# Patient Record
Sex: FEMALE | Race: WHITE | URBAN - METROPOLITAN AREA
[De-identification: names, ages, dates, MRNs, and addresses within clinical notes are randomized per-mention and may not be internally consistent; named-entity substitution may affect disease eponyms.]

---

## 2017-02-25 ENCOUNTER — EMERGENCY (EMERGENCY)
Facility: HOSPITAL | Age: 22
LOS: 0 days | Discharge: HOME | End: 2017-02-25

## 2017-06-27 DIAGNOSIS — M62.830 MUSCLE SPASM OF BACK: ICD-10-CM

## 2017-06-27 DIAGNOSIS — J45.909 UNSPECIFIED ASTHMA, UNCOMPLICATED: ICD-10-CM

## 2017-06-27 DIAGNOSIS — M54.2 CERVICALGIA: ICD-10-CM
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
## 2017-07-15 ENCOUNTER — EMERGENCY (EMERGENCY)
Facility: HOSPITAL | Age: 22
LOS: 0 days | Discharge: HOME | End: 2017-07-15

## 2017-07-15 DIAGNOSIS — S00.03XA CONTUSION OF SCALP, INITIAL ENCOUNTER: ICD-10-CM

## 2017-07-15 DIAGNOSIS — Z79.899 OTHER LONG TERM (CURRENT) DRUG THERAPY: ICD-10-CM

## 2017-07-15 DIAGNOSIS — R51 HEADACHE: ICD-10-CM
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
## 2017-07-15 DIAGNOSIS — J45.909 UNSPECIFIED ASTHMA, UNCOMPLICATED: ICD-10-CM

## 2017-09-03 ENCOUNTER — EMERGENCY (EMERGENCY)
Facility: HOSPITAL | Age: 22
LOS: 0 days | Discharge: HOME | End: 2017-09-03

## 2017-09-03 DIAGNOSIS — R35.0 FREQUENCY OF MICTURITION: ICD-10-CM

## 2017-09-03 DIAGNOSIS — F41.9 ANXIETY DISORDER, UNSPECIFIED: ICD-10-CM

## 2017-09-03 DIAGNOSIS — R10.9 UNSPECIFIED ABDOMINAL PAIN: ICD-10-CM

## 2017-09-03 DIAGNOSIS — J45.909 UNSPECIFIED ASTHMA, UNCOMPLICATED: ICD-10-CM

## 2017-09-03 DIAGNOSIS — M54.6 PAIN IN THORACIC SPINE: ICD-10-CM

## 2017-09-03 DIAGNOSIS — Z79.899 OTHER LONG TERM (CURRENT) DRUG THERAPY: ICD-10-CM

## 2018-04-21 ENCOUNTER — EMERGENCY (EMERGENCY)
Facility: HOSPITAL | Age: 23
LOS: 0 days | Discharge: HOME | End: 2018-04-21
Attending: EMERGENCY MEDICINE | Admitting: EMERGENCY MEDICINE

## 2018-04-21 VITALS
TEMPERATURE: 99 F | OXYGEN SATURATION: 96 % | HEIGHT: 66 IN | WEIGHT: 229.94 LBS | SYSTOLIC BLOOD PRESSURE: 149 MMHG | HEART RATE: 109 BPM | DIASTOLIC BLOOD PRESSURE: 98 MMHG | RESPIRATION RATE: 18 BRPM

## 2018-04-21 DIAGNOSIS — M79.89 OTHER SPECIFIED SOFT TISSUE DISORDERS: ICD-10-CM

## 2018-04-21 DIAGNOSIS — M79.672 PAIN IN LEFT FOOT: ICD-10-CM

## 2018-04-21 RX ORDER — IBUPROFEN 200 MG
600 TABLET ORAL ONCE
Qty: 0 | Refills: 0 | Status: COMPLETED | OUTPATIENT
Start: 2018-04-21 | End: 2018-04-21

## 2018-04-21 RX ADMIN — Medication 600 MILLIGRAM(S): at 13:15

## 2018-04-21 NOTE — ED PROVIDER NOTE - NS ED ROS FT
Constitutional: (-) fever  Skin: No rash  Muskuloskeletal: left foot pain  Neurological: (-) altered mental status

## 2018-04-21 NOTE — ED PROVIDER NOTE - PHYSICAL EXAMINATION
Physical Exam    Vital Signs: I have reviewed the initial vital signs.  Constitutional: well-nourished, appears stated age, no acute distress  Skin: warm, dry,  no rash  Muskuloskeletal: left foot: +swelling and tender to palpation dorsum of foot over MT. No ecchymosis. FROM ankle and foot. n/v intact. limping gait  Neuro: AOx3, Motor 5/5, Sensation: equal and intact

## 2018-04-21 NOTE — ED ADULT TRIAGE NOTE - CHIEF COMPLAINT QUOTE
Patient complaining of pain to left foot that started one month ago. Pain worsened in past day, described as sharp spasm type pain 8/10.

## 2018-04-21 NOTE — ED PROVIDER NOTE - MEDICAL DECISION MAKING DETAILS
x ray results reviewed with pt and family member and copy of report provided to her.  Rec ALAN.  Will give crutches prn, follow up with Podiatry.

## 2018-04-21 NOTE — ED PROVIDER NOTE - ATTENDING CONTRIBUTION TO CARE
23 yo F presents with c/o left foot pain x 1 month.  No h/o trauma.  Works on her feet.  On exam pt in NAD AAO x 3, + mild swelling with tenderness to dorsum left foot, no skin changes, + DP pulses

## 2018-06-18 ENCOUNTER — EMERGENCY (EMERGENCY)
Facility: HOSPITAL | Age: 23
LOS: 0 days | Discharge: HOME | End: 2018-06-18
Attending: EMERGENCY MEDICINE | Admitting: EMERGENCY MEDICINE

## 2018-06-18 DIAGNOSIS — K14.6 GLOSSODYNIA: ICD-10-CM

## 2018-06-18 RX ORDER — DIPHENHYDRAMINE HYDROCHLORIDE AND LIDOCAINE HYDROCHLORIDE AND ALUMINUM HYDROXIDE AND MAGNESIUM HYDRO
10 KIT ONCE
Qty: 0 | Refills: 0 | Status: COMPLETED | OUTPATIENT
Start: 2018-06-18 | End: 2018-06-18

## 2018-06-18 RX ORDER — DIPHENHYDRAMINE HYDROCHLORIDE AND LIDOCAINE HYDROCHLORIDE AND ALUMINUM HYDROXIDE AND MAGNESIUM HYDRO
10 KIT
Qty: 178 | Refills: 0 | OUTPATIENT
Start: 2018-06-18 | End: 2018-06-22

## 2018-06-18 NOTE — ED PROVIDER NOTE - MEDICAL DECISION MAKING DETAILS
I personally evaluated the patient. I reviewed the Resident’s or Physician Assistant’s note (as assigned above), and agree with the findings and plan except as documented in my note.  Chart reviewed. Presents with burning sensation to tongue. No SOB or drooling. Exam shows alert patient in no distress, HEENT tongue no swelling, ulcers or exudates, throat no exudates, lungs clear. Given Magic Mouthwash and referred to ENT.

## 2018-06-18 NOTE — ED PROVIDER NOTE - OBJECTIVE STATEMENT
23 year old female no pmhx c/o tongue pain and burning that woke her up from sleep. Pt has also been brushing her tongue multiple times. No difficulty swallowing/breathing. 23 year old female no pmhx c/o tongue pain and burning that woke her up from sleep. Pt has also been brushing her tongue multiple times. No difficulty swallowing/breathing. Denies any lesions/ulcers/sore on tongue/mouth.

## 2018-06-18 NOTE — ED PROVIDER NOTE - PHYSICAL EXAMINATION
CONSTITUTIONAL: Well-appearing; well-nourished; in no apparent distress.   EYES: PERRLA; EOM intact.   ENT: No tongue swelling, ulcerations, normal nose; no rhinorrhea; normal pharynx with no tonsillar hypertrophy, no exudates.   NECK: Supple; non-tender; no cervical lymphadenopathy. No JVD.   CARDIOVASCULAR: Normal S1, S2; no murmurs, rubs, or gallops.   RESPIRATORY: Normal chest excursion with respiration; breath sounds clear and equal bilaterally; no wheezes, rhonchi, or rales.  SKIN: Normal for age and race; warm; dry; good turgor; no apparent lesions or exudate.   NEURO/PSYCH: A & O x 4; grossly unremarkable. mood and manner are appropriate. Grooming and personal hygiene are appropriate. No apparent thoughts of harm to self or others. CONSTITUTIONAL: Well-appearing; well-nourished; in no apparent distress.   EYES: PERRLA; EOM intact.   ENT: No tongue swelling, ulcerations/lesions, normal nose; no rhinorrhea; normal pharynx with no tonsillar hypertrophy, no exudate, abscesses, uvula midline.   NECK: Supple; non-tender; no cervical lymphadenopathy. No JVD.   CARDIOVASCULAR: Normal S1, S2; no murmurs, rubs, or gallops.   RESPIRATORY: Normal chest excursion with respiration; breath sounds clear and equal bilaterally; no wheezes, rhonchi, or rales.  SKIN: Normal for age and race; warm; dry; good turgor; no apparent lesions or exudate.   NEURO/PSYCH: A & O x 4; grossly unremarkable. mood and manner are appropriate. Grooming and personal hygiene are appropriate. No apparent thoughts of harm to self or others.

## 2018-06-18 NOTE — ED PROVIDER NOTE - PROGRESS NOTE DETAILS
I personally evaluated the patient. I reviewed the Physician Assistant Fellow's note and agree with the findings and plan. no lesions/ulceration/swelling/thrush noted on tongue. advised patient those white spots on tongue are normal and stopped brushing her tongue. First BLM given to patient for symptom control. f/u with ENT.

## 2018-06-18 NOTE — ED PROVIDER NOTE - NS ED ROS FT
CONST: No fever, chills or bodyaches  EYES: No pain, redness, drainage or visual changes.  ENT: + tongue pain/burning. No ear pain or discharge, nasal discharge or congestion. No sore throat  CARD: No chest pain, palpitations  RESP: No SOB, cough, hemoptysis. No hx of asthma or COPD  GI: No abdominal pain, N/V/D  MS: No joint pain, back pain or extremity pain/injury  SKIN: No rashes  NEURO: No headache, dizziness, paresthesias or LOC

## 2018-06-20 RX ORDER — LIDOCAINE 4 G/100G
5 CREAM TOPICAL
Qty: 100 | Refills: 0 | OUTPATIENT
Start: 2018-06-20 | End: 2018-06-25

## 2018-06-22 ENCOUNTER — EMERGENCY (EMERGENCY)
Facility: HOSPITAL | Age: 23
LOS: 0 days | Discharge: HOME | End: 2018-06-22
Attending: EMERGENCY MEDICINE | Admitting: EMERGENCY MEDICINE

## 2018-06-22 VITALS
RESPIRATION RATE: 20 BRPM | DIASTOLIC BLOOD PRESSURE: 85 MMHG | TEMPERATURE: 98 F | SYSTOLIC BLOOD PRESSURE: 134 MMHG | HEART RATE: 150 BPM | OXYGEN SATURATION: 98 %

## 2018-06-22 VITALS — SYSTOLIC BLOOD PRESSURE: 128 MMHG | TEMPERATURE: 100 F | DIASTOLIC BLOOD PRESSURE: 71 MMHG | HEART RATE: 93 BPM

## 2018-06-22 DIAGNOSIS — R42 DIZZINESS AND GIDDINESS: ICD-10-CM

## 2018-06-22 DIAGNOSIS — R00.2 PALPITATIONS: ICD-10-CM

## 2018-06-22 DIAGNOSIS — N39.0 URINARY TRACT INFECTION, SITE NOT SPECIFIED: ICD-10-CM

## 2018-06-22 LAB
ALBUMIN SERPL ELPH-MCNC: 4.5 G/DL — SIGNIFICANT CHANGE UP (ref 3.5–5.2)
ALP SERPL-CCNC: 48 U/L — SIGNIFICANT CHANGE UP (ref 30–115)
ALT FLD-CCNC: 26 U/L — SIGNIFICANT CHANGE UP (ref 0–41)
ANION GAP SERPL CALC-SCNC: 13 MMOL/L — SIGNIFICANT CHANGE UP (ref 7–14)
APPEARANCE UR: (no result)
AST SERPL-CCNC: 19 U/L — SIGNIFICANT CHANGE UP (ref 0–41)
BACTERIA # UR AUTO: (no result) /HPF
BASOPHILS # BLD AUTO: 0.02 K/UL — SIGNIFICANT CHANGE UP (ref 0–0.2)
BASOPHILS NFR BLD AUTO: 0.2 % — SIGNIFICANT CHANGE UP (ref 0–1)
BILIRUB SERPL-MCNC: 0.3 MG/DL — SIGNIFICANT CHANGE UP (ref 0.2–1.2)
BILIRUB UR-MCNC: NEGATIVE — SIGNIFICANT CHANGE UP
BUN SERPL-MCNC: 9 MG/DL — LOW (ref 10–20)
CALCIUM SERPL-MCNC: 9.3 MG/DL — SIGNIFICANT CHANGE UP (ref 8.5–10.1)
CHLORIDE SERPL-SCNC: 101 MMOL/L — SIGNIFICANT CHANGE UP (ref 98–110)
CK MB CFR SERPL CALC: <1 NG/ML — SIGNIFICANT CHANGE UP (ref 0.6–6.3)
CK SERPL-CCNC: 84 U/L — SIGNIFICANT CHANGE UP (ref 0–225)
CO2 SERPL-SCNC: 26 MMOL/L — SIGNIFICANT CHANGE UP (ref 17–32)
COLOR SPEC: YELLOW — SIGNIFICANT CHANGE UP
CREAT SERPL-MCNC: 0.6 MG/DL — LOW (ref 0.7–1.5)
D DIMER BLD IA.RAPID-MCNC: 132 NG/ML DDU — SIGNIFICANT CHANGE UP (ref 0–230)
DIFF PNL FLD: (no result)
EOSINOPHIL # BLD AUTO: 0.03 K/UL — SIGNIFICANT CHANGE UP (ref 0–0.7)
EOSINOPHIL NFR BLD AUTO: 0.3 % — SIGNIFICANT CHANGE UP (ref 0–8)
EPI CELLS # UR: (no result) /HPF
GLUCOSE SERPL-MCNC: 106 MG/DL — HIGH (ref 70–99)
GLUCOSE UR QL: NEGATIVE MG/DL — SIGNIFICANT CHANGE UP
HCT VFR BLD CALC: 37.5 % — SIGNIFICANT CHANGE UP (ref 37–47)
HGB BLD-MCNC: 12.2 G/DL — SIGNIFICANT CHANGE UP (ref 12–16)
IMM GRANULOCYTES NFR BLD AUTO: 0.4 % — HIGH (ref 0.1–0.3)
KETONES UR-MCNC: NEGATIVE — SIGNIFICANT CHANGE UP
LEUKOCYTE ESTERASE UR-ACNC: (no result)
LYMPHOCYTES # BLD AUTO: 1.15 K/UL — LOW (ref 1.2–3.4)
LYMPHOCYTES # BLD AUTO: 12.4 % — LOW (ref 20.5–51.1)
MCHC RBC-ENTMCNC: 26.5 PG — LOW (ref 27–31)
MCHC RBC-ENTMCNC: 32.5 G/DL — SIGNIFICANT CHANGE UP (ref 32–37)
MCV RBC AUTO: 81.5 FL — SIGNIFICANT CHANGE UP (ref 81–99)
MONOCYTES # BLD AUTO: 0.61 K/UL — HIGH (ref 0.1–0.6)
MONOCYTES NFR BLD AUTO: 6.6 % — SIGNIFICANT CHANGE UP (ref 1.7–9.3)
NEUTROPHILS # BLD AUTO: 7.44 K/UL — HIGH (ref 1.4–6.5)
NEUTROPHILS NFR BLD AUTO: 80.1 % — HIGH (ref 42.2–75.2)
NITRITE UR-MCNC: NEGATIVE — SIGNIFICANT CHANGE UP
NRBC # BLD: 0 /100 WBCS — SIGNIFICANT CHANGE UP (ref 0–0)
NT-PROBNP SERPL-SCNC: 59 PG/ML — SIGNIFICANT CHANGE UP (ref 0–300)
PH UR: 6 — SIGNIFICANT CHANGE UP (ref 5–8)
PLATELET # BLD AUTO: 301 K/UL — SIGNIFICANT CHANGE UP (ref 130–400)
POTASSIUM SERPL-MCNC: 4.2 MMOL/L — SIGNIFICANT CHANGE UP (ref 3.5–5)
POTASSIUM SERPL-SCNC: 4.2 MMOL/L — SIGNIFICANT CHANGE UP (ref 3.5–5)
PROT SERPL-MCNC: 7.5 G/DL — SIGNIFICANT CHANGE UP (ref 6–8)
PROT UR-MCNC: NEGATIVE MG/DL — SIGNIFICANT CHANGE UP
RBC # BLD: 4.6 M/UL — SIGNIFICANT CHANGE UP (ref 4.2–5.4)
RBC # FLD: 12.2 % — SIGNIFICANT CHANGE UP (ref 11.5–14.5)
RBC CASTS # UR COMP ASSIST: (no result) /HPF
SODIUM SERPL-SCNC: 140 MMOL/L — SIGNIFICANT CHANGE UP (ref 135–146)
SP GR SPEC: 1.02 — SIGNIFICANT CHANGE UP (ref 1.01–1.03)
TROPONIN T SERPL-MCNC: <0.01 NG/ML — SIGNIFICANT CHANGE UP
UROBILINOGEN FLD QL: 1 MG/DL (ref 0.2–0.2)
WBC # BLD: 9.29 K/UL — SIGNIFICANT CHANGE UP (ref 4.8–10.8)
WBC # FLD AUTO: 9.29 K/UL — SIGNIFICANT CHANGE UP (ref 4.8–10.8)
WBC UR QL: (no result) /HPF

## 2018-06-22 RX ORDER — KETOROLAC TROMETHAMINE 30 MG/ML
15 SYRINGE (ML) INJECTION ONCE
Qty: 0 | Refills: 0 | Status: DISCONTINUED | OUTPATIENT
Start: 2018-06-22 | End: 2018-06-22

## 2018-06-22 RX ORDER — SODIUM CHLORIDE 9 MG/ML
1000 INJECTION, SOLUTION INTRAVENOUS ONCE
Qty: 0 | Refills: 0 | Status: COMPLETED | OUTPATIENT
Start: 2018-06-22 | End: 2018-06-22

## 2018-06-22 RX ORDER — NITROFURANTOIN MACROCRYSTAL 50 MG
1 CAPSULE ORAL
Qty: 14 | Refills: 0 | OUTPATIENT
Start: 2018-06-22 | End: 2018-06-28

## 2018-06-22 RX ADMIN — SODIUM CHLORIDE 1000 MILLILITER(S): 9 INJECTION, SOLUTION INTRAVENOUS at 16:06

## 2018-06-22 RX ADMIN — Medication 15 MILLIGRAM(S): at 16:22

## 2018-06-22 NOTE — ED ADULT TRIAGE NOTE - SOURCE OF INFORMATION
Frank Dodson  1960  HOME: [phone]   WORK: [phone]   CELL: N/A         SELF calling and requesting an same day appointment with Dr. Torres for the following symptoms: left foot swollen, leg and ankle pain and difficulty walking on.      Please call.    
Patient

## 2018-06-22 NOTE — ED PROVIDER NOTE - PHYSICAL EXAMINATION
CONSTITUTIONAL: Well-developed; well-nourished; in no acute distress.   SKIN: warm, dry  HEAD: Normocephalic; atraumatic.  EYES: PERRL, EOMI, no conjunctival erythema  ENT: Oropharynx is clear, nonedematous, nonerythematous.  NECK: Supple; non tender.  CARD: S1, S2 normal; no murmurs, gallops, or rubs. Tachycardic.   RESP: No wheezes, rales or rhonchi.  ABD: soft ntnd  EXT: Non-edematous, nonerythematous lower extremities bilaterally.   NEURO: Alert, oriented, grossly unremarkable.  PSYCH: Cooperative, appropriate.

## 2018-06-22 NOTE — ED PROVIDER NOTE - PROGRESS NOTE DETAILS
ATTENDING NOTE: 24 y/o female with no significant pmhx, presents to the ED c/o palpitations that started at 1PM today while she was at work. Pt notes she woke up with a sore throat this morning, but has been having white spots on tongue which she was evaluated [facility] and was told it was fine. Admits to always feeling dizzy which has been occurring for almost a year now with intermittent headaches. Pt is seeing Dr. Almendarez who did MRI 2 weeks ago but unsure of results. Pt also saw ophthalmologist and was told she has papilledema, however pt hasn’t scheduled a LP yet. LMP: 1 month ago, on birth control, no family hx of clotting disorder. No fever, chills, n/v, cp, sob, pleuritic cp, palpitations, diaphoresis, cough, numbness/tingling, neck pain/ stiffness, abd pain, diarrhea, constipation, melena/brbpr, urinary symptoms, trauma, weakness, edema, calf pain/swelling/erythema, sick contacts, recent travel or rash. On exam: Pt sitting comfortably on stretcher in NAD. No rash. Conjunctivae and sclera clear. MMM. OP with no erythema, no exudates, no petechiae, uvula midline, no PTA, no thrush. TM's visualized, no erythema, no mastoid erythema, tachycardic, radial pulses 2/4 b/l. Breath sounds present b/l, CTABL, no wheezing or crackles, no tachypnea, no accessory muscle use, good resp effort and excursion, good air exchange, bs present throughout all 4 quadrants. Abd soft, nd, nt, no rebound or guarding, no cvat, (-) murphys, (-) rovsing (-) obturator (-) psoas. FROM of ext, no edema, no calf pain/swelling/erythema. AAOx3, motor 5/5 and sensation intact throughout upper and lower ext. PERRL, EOMI, CN II-XII intact, negative pronator, negative Romberg. Plan: EKG, CXR, labs, IVF, U-preg, urine, d-dimer, and reassess. Patient developed low grade fever, given 15 mg of Toradol and 1 L bolus of LR. Tachycardia improving- at bedside HR in 110's. U/A and culture waiting to be sent. U/A resulted with many bacteria, moderate leukocyte esterase, blood concerning for UTI. Tachycardia responding to hydration; HR in 90's. Patient informed of urine results and will be discharged with PO abx. ATTENDING NOTE: 22 y/o female with no significant pmhx, presents to the ED c/o palpitations that started at 1PM today while she was at work. Pt notes she woke up with a sore throat this morning, but has been having white spots on tongue which she was evaluated for by the [facility] and was told it was viral. Admits to always feeling dizzy which has been occurring for almost a year now with intermittent headaches. Pt is seeing Dr. Almendarez who did MRI 2 weeks ago but unsure of results. Pt also saw ophthalmologist and was told she has papilledema, and is being followed as outpt for scheduling of LP. LMP: 1 month ago, on birth control, no family hx of clotting disorder. No fever, chills, n/v, cp, sob, pleuritic cp, ear pain, diaphoresis, cough, numbness/tingling, neck pain/ stiffness, photo/phonophobia, neck pain/stiffness, abd pain, diarrhea, constipation, melena/brbpr, urinary sxs, trauma, weakness, edema, calf pain/swelling/erythema, sick contacts, recent travel or rash. On exam: Pt sitting comfortably on stretcher in NAD. No rash. Conjunctivae and sclera clear. MMM. OP with no erythema, no exudates, no petechiae, uvula midline, no PTA, no thrush. no neck pain/stiffnes, supple, no spinous tenderness to palpation, no anterior neck pain, no torticolosis, TM's visualized, no erythema, no mastoid erythema, tachycardic, radial pulses 2/4 b/l. Breath sounds present b/l, CTABL, no wheezing or crackles, no tachypnea, no accessory muscle use, good resp effort and excursion, good air exchange, bs present throughout all 4 quadrants. Abd soft, nd, nt, no rebound or guarding, no cvat, (-) murphys, (-) rovsing (-) obturator (-) psoas. FROM of ext, no edema, no calf pain/swelling/erythema. AAOx3, motor 5/5 and sensation intact throughout upper and lower ext. PERRL, EOMI, CN II-XII intact, negative pronator, negative Romberg. No dysmetria with ftn and rapid alternating fine movements, ambulatory with no ataxia, NIH 0. Plan: EKG, CXR, labs, IVF, U-preg, urine, d-dimer, and reassess.

## 2018-06-22 NOTE — ED PROVIDER NOTE - MEDICAL DECISION MAKING DETAILS
pt feeling much better, tolerated po, HR improved, has been following up w/ neurology extensively, aware of signs and symptoms to return for, reports no vaginal bleeding.discharge, sti's, given abx for uti, reports will follow up with her phsycian, ob-gyn and neurology as she has been.

## 2018-06-22 NOTE — ED PROVIDER NOTE - NS ED ROS FT
Eyes:  No visual changes, eye pain or discharge.  ENMT:  No hearing changes, pain, no sore throat or runny nose, no difficulty swallowing  Cardiac:  No chest pain, SOB or edema.   Respiratory:  No cough or respiratory distress. No hemoptysis. No history of asthma or RAD.  GI:  No vomiting, diarrhea or abdominal pain.  :  No dysuria, frequency or burning.  MS:  No myalgia, muscle weakness, joint pain or back pain.  Neuro:  Dizziness.  Skin:  No skin rash.   Endocrine: No history of thyroid disease or diabetes.

## 2018-06-22 NOTE — ED PROVIDER NOTE - OBJECTIVE STATEMENT
Patient is a 22 yo F w/ PMH of frequent headaches, dizziness and papilledema followed by neurologist Dr. Almendarez, p/w dizziness and new onset palpitations x 2 hours. Patient has experienced dizziness for multiple months, being followed by Dr. Almendarez; found to have papilledema, and plan to get LP for symptomatic relief. 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 Denies chest pain, SOB. Endorses taking OCPs, denies recent travel, calf pain, and swelling. Denies fevers, coughing.

## 2018-06-22 NOTE — ED ADULT NURSE NOTE - OBJECTIVE STATEMENT
patient states last week went to [facility] for tongue swollen with white patches and was told "its nothing" and was sent home, as per patient. Pt was dx by her eye doctor yesterday with papilledema and has a rx for spinal tap with neuro dx on 7/10. Pt states while working today and became dizzy and had palpitations and  came down to the er. pt on birth control. patient also states shes "always dizzy"

## 2018-06-23 LAB
CULTURE RESULTS: SIGNIFICANT CHANGE UP
SPECIMEN SOURCE: SIGNIFICANT CHANGE UP

## 2018-07-11 ENCOUNTER — INPATIENT (INPATIENT)
Facility: HOSPITAL | Age: 23
LOS: 1 days | Discharge: HOME | End: 2018-07-13
Attending: INTERNAL MEDICINE | Admitting: INTERNAL MEDICINE

## 2018-07-11 VITALS
TEMPERATURE: 98 F | OXYGEN SATURATION: 99 % | RESPIRATION RATE: 20 BRPM | SYSTOLIC BLOOD PRESSURE: 131 MMHG | HEART RATE: 89 BPM | DIASTOLIC BLOOD PRESSURE: 87 MMHG

## 2018-07-11 LAB
24R-OH-CALCIDIOL SERPL-MCNC: 54 NG/ML — SIGNIFICANT CHANGE UP (ref 30–80)
ALBUMIN SERPL ELPH-MCNC: 4.4 G/DL — SIGNIFICANT CHANGE UP (ref 3.5–5.2)
ALP SERPL-CCNC: 49 U/L — SIGNIFICANT CHANGE UP (ref 30–115)
ALT FLD-CCNC: 24 U/L — SIGNIFICANT CHANGE UP (ref 0–41)
ANION GAP SERPL CALC-SCNC: 14 MMOL/L — SIGNIFICANT CHANGE UP (ref 7–14)
APTT BLD: 28.5 SEC — SIGNIFICANT CHANGE UP (ref 27–39.2)
AST SERPL-CCNC: 19 U/L — SIGNIFICANT CHANGE UP (ref 0–41)
BASOPHILS # BLD AUTO: 0.05 K/UL — SIGNIFICANT CHANGE UP (ref 0–0.2)
BASOPHILS NFR BLD AUTO: 0.5 % — SIGNIFICANT CHANGE UP (ref 0–1)
BILIRUB DIRECT SERPL-MCNC: <0.2 MG/DL — SIGNIFICANT CHANGE UP (ref 0–0.2)
BILIRUB INDIRECT FLD-MCNC: SIGNIFICANT CHANGE UP MG/DL (ref 0.2–1.2)
BILIRUB SERPL-MCNC: <0.2 MG/DL — SIGNIFICANT CHANGE UP (ref 0.2–1.2)
BUN SERPL-MCNC: 11 MG/DL — SIGNIFICANT CHANGE UP (ref 10–20)
CALCIUM SERPL-MCNC: 9.8 MG/DL — SIGNIFICANT CHANGE UP (ref 8.5–10.1)
CHLORIDE SERPL-SCNC: 103 MMOL/L — SIGNIFICANT CHANGE UP (ref 98–110)
CO2 SERPL-SCNC: 22 MMOL/L — SIGNIFICANT CHANGE UP (ref 17–32)
CREAT SERPL-MCNC: 0.7 MG/DL — SIGNIFICANT CHANGE UP (ref 0.7–1.5)
EOSINOPHIL # BLD AUTO: 0.03 K/UL — SIGNIFICANT CHANGE UP (ref 0–0.7)
EOSINOPHIL NFR BLD AUTO: 0.3 % — SIGNIFICANT CHANGE UP (ref 0–8)
ERYTHROCYTE [SEDIMENTATION RATE] IN BLOOD: 16 MM/HR — HIGH (ref 0–15)
GLUCOSE SERPL-MCNC: 105 MG/DL — HIGH (ref 70–99)
HCT VFR BLD CALC: 38.6 % — SIGNIFICANT CHANGE UP (ref 37–47)
HGB BLD-MCNC: 12.4 G/DL — SIGNIFICANT CHANGE UP (ref 12–16)
IMM GRANULOCYTES NFR BLD AUTO: 0.4 % — HIGH (ref 0.1–0.3)
INR BLD: 1.06 RATIO — SIGNIFICANT CHANGE UP (ref 0.65–1.3)
LYMPHOCYTES # BLD AUTO: 2.72 K/UL — SIGNIFICANT CHANGE UP (ref 1.2–3.4)
LYMPHOCYTES # BLD AUTO: 29.7 % — SIGNIFICANT CHANGE UP (ref 20.5–51.1)
MCHC RBC-ENTMCNC: 26.6 PG — LOW (ref 27–31)
MCHC RBC-ENTMCNC: 32.1 G/DL — SIGNIFICANT CHANGE UP (ref 32–37)
MCV RBC AUTO: 82.7 FL — SIGNIFICANT CHANGE UP (ref 81–99)
MONOCYTES # BLD AUTO: 0.67 K/UL — HIGH (ref 0.1–0.6)
MONOCYTES NFR BLD AUTO: 7.3 % — SIGNIFICANT CHANGE UP (ref 1.7–9.3)
NEUTROPHILS # BLD AUTO: 5.64 K/UL — SIGNIFICANT CHANGE UP (ref 1.4–6.5)
NEUTROPHILS NFR BLD AUTO: 61.8 % — SIGNIFICANT CHANGE UP (ref 42.2–75.2)
NRBC # BLD: 0 /100 WBCS — SIGNIFICANT CHANGE UP (ref 0–0)
PLATELET # BLD AUTO: 312 K/UL — SIGNIFICANT CHANGE UP (ref 130–400)
POTASSIUM SERPL-MCNC: 4 MMOL/L — SIGNIFICANT CHANGE UP (ref 3.5–5)
POTASSIUM SERPL-SCNC: 4 MMOL/L — SIGNIFICANT CHANGE UP (ref 3.5–5)
PROT SERPL-MCNC: 7.5 G/DL — SIGNIFICANT CHANGE UP (ref 6–8)
PROTHROM AB SERPL-ACNC: 11.4 SEC — SIGNIFICANT CHANGE UP (ref 9.95–12.87)
RBC # BLD: 4.67 M/UL — SIGNIFICANT CHANGE UP (ref 4.2–5.4)
RBC # FLD: 12.1 % — SIGNIFICANT CHANGE UP (ref 11.5–14.5)
SODIUM SERPL-SCNC: 139 MMOL/L — SIGNIFICANT CHANGE UP (ref 135–146)
WBC # BLD: 9.15 K/UL — SIGNIFICANT CHANGE UP (ref 4.8–10.8)
WBC # FLD AUTO: 9.15 K/UL — SIGNIFICANT CHANGE UP (ref 4.8–10.8)

## 2018-07-11 RX ORDER — OXYCODONE AND ACETAMINOPHEN 5; 325 MG/1; MG/1
1 TABLET ORAL EVERY 8 HOURS
Qty: 0 | Refills: 0 | Status: DISCONTINUED | OUTPATIENT
Start: 2018-07-11 | End: 2018-07-13

## 2018-07-11 RX ORDER — ACETAZOLAMIDE 250 MG/1
1 TABLET ORAL
Qty: 0 | Refills: 0 | COMMUNITY

## 2018-07-11 NOTE — H&P ADULT - NSHPPHYSICALEXAM_GEN_ALL_CORE
ICU Vital Signs Last 24 Hrs  T(C): 36.7 (11 Jul 2018 11:46), Max: 36.8 (11 Jul 2018 09:27)  T(F): 98.1 (11 Jul 2018 11:46), Max: 98.2 (11 Jul 2018 09:27)  HR: 81 (11 Jul 2018 11:46) (81 - 89)  BP: 91/64 (11 Jul 2018 11:46) (91/64 - 131/87)  RR: 19 (11 Jul 2018 11:46) (19 - 20)  SpO2: 99% (11 Jul 2018 11:46) (99% - 99%)    PHYSICAL EXAM:  GENERAL: NAD, well-developed  HEAD:  Atraumatic, Normocephalic  EYES: EOMI, PERRLA, conjunctiva and sclera clear  NECK: Supple, No JVD  CHEST/LUNG: Clear to auscultation bilaterally; No wheeze  HEART: Regular rate and rhythm; No murmurs, rubs, or gallops  ABDOMEN: Soft, Nontender, Nondistended; Bowel sounds present  EXTREMITIES:  2+ Peripheral Pulses, No clubbing, cyanosis, or edema  PSYCH: AAOx3  NEUROLOGY: non-focal  SKIN: No rashes or lesions

## 2018-07-11 NOTE — ED ADULT NURSE NOTE - OBJECTIVE STATEMENT
Patient present to ED from neurology MD for further evaluation for papilledema. Patient has been experiencing headache, dizziness, nausea, vomiting, and increased photosensitivity within the past year. Neuro MD is requesting MRI/MRA and LP for diagnosing. At this time, patient denies symptoms is currently on diomax 250mg.

## 2018-07-11 NOTE — H&P ADULT - ASSESSMENT
PT has been c/o headaches with nausea over the past year and has been getting neuro workup as outpatient and was found to have papilledema.    1) Headache R/o Idiopathic intracranial Hypertension  -History of 1 year of headache, ringing in ear and papilledema  -Pt has history of birth control use  -Has high BMI  -MRI brain  - Lumbar puncture for measuring opening pressure    2) DVT prophylaxis   Encourage ambulation    3) GI prophylaxis  Protnoix 40 mg PT has been c/o headaches with nausea over the past year and has been getting neuro workup as outpatient and was found to have papilledema.    1) Headache R/o Idiopathic intracranial Hypertension  -History of 1 year of headache, ringing in ear and papilledema  -Pt has history of birth control use  -Has high BMI  -MRI brain  - Lumbar puncture for measuring opening pressure after MRI    2) DVT prophylaxis   Encourage ambulation    3) GI prophylaxis  Protnoix 40 mg PT has been c/o headaches with nausea over the past year and has been getting neuro workup as outpatient and was found to have papilledema.    1) Headache R/o Idiopathic intracranial Hypertension  -History of 1 year of headache, ringing in ear and papilledema  -Pt has history of birth control use  -Has high BMI  -MRI brain  - Lumbar puncture preferably by IR for measuring opening pressure after MRI    2) DVT prophylaxis   Encourage ambulation    3) GI prophylaxis  Protnoix 40 mg

## 2018-07-11 NOTE — H&P ADULT - NSHPLABSRESULTS_GEN_ALL_CORE
12.4   9.15  )-----------( 312      ( 11 Jul 2018 10:53 )             38.6       07-11    139  |  103  |  11  ----------------------------<  105<H>  4.0   |  22  |  0.7    Ca    9.8      11 Jul 2018 10:53    TPro  7.5  /  Alb  4.4  /  TBili  <0.2  /  DBili  <0.2  /  AST  19  /  ALT  24  /  AlkPhos  49  07-11

## 2018-07-11 NOTE — H&P ADULT - HISTORY OF PRESENT ILLNESS
Pt is a 24 yo F sent to ED by neurologist Dr Almendarez for admission. PT has been c/o headaches with nausea over the past year and has been getting neuro workup as outpatient and was found to have papilledema. Headache is usually localized over Right fronto-temporal area , throbbing in character , sudden in onset , associated with photophobia and ringing in ears bilaterally. Bending forward makes it worst and nothing helps it. Pt has tried multiple OTC analgesics with no improvement. Recently for the past two weeks her headaches have started to get bad which led her to seek medical assistance. Pt endorses tingling in both the feet for one day. She was using birth control pills for two years which she stopped one day back.  Pt was told to come to the ED by neurologist to have full inpatient workup including MRI, LP and blood work to r/o pseudotumor cerebri. Denies visual changes, N/V, fever. Pt is a 22 yo F sent to ED by neurologist Dr Almendarez for admission. PT has been c/o headaches with nausea over the past year and has been getting neuro workup as outpatient and was found to have papilledema. Headache is usually localized over Right fronto-temporal area , throbbing in character , sudden in onset , associated with photophobia and ringing in ears bilaterally. Bending forward makes it worst and nothing helps it. Pt has tried multiple OTC analgesics with no improvement. Recently for the past two weeks her headaches have started to get bad which led her to seek medical assistance. Pt endorses tingling in both the feet for one day. She was using birth control pills for two years which she stopped one day back. Denies visual changes, N/V, fever.

## 2018-07-11 NOTE — ED PROVIDER NOTE - OBJECTIVE STATEMENT
Pt is a 24 yo F sent to ED by neuro Dr Almendarez for admission. PT has been c/o headaches with dizziness over the past year and has been getting neuro workup as outpatient and was found to have papilledema. Pt was told to come to the ED to have full inpatient workup including MRI, LP and blood work to r/o pseudotumor cerebri. Pt sts her sx today are minimal and no worsening of her sx recently. Denies visual changes, N/V, fever.

## 2018-07-11 NOTE — ED PROVIDER NOTE - ATTENDING CONTRIBUTION TO CARE
Pt sent in by Dr. Gunderosn for admission for chronic symptoms.  Pt denies acute change in symtpoms.  Neuro non focal. Will check labs as requested by Dr. Almendarez and admit

## 2018-07-11 NOTE — ED PROVIDER NOTE - NS ED ROS FT
CONST: No fever, chills or bodyaches  EYES: No pain, redness, drainage or visual changes.  ENT: No ear pain or discharge, nasal discharge or congestion. No sore throat  CARD: No chest pain, palpitations  RESP: No SOB, cough, hemoptysis. No hx of asthma or COPD  GI: No abdominal pain, N/V/D  MS: No joint pain, back pain or extremity pain/injury  SKIN: No rashes  NEURO: +Headache, dizziness. No paresthesias or LOC

## 2018-07-12 LAB
ANION GAP SERPL CALC-SCNC: 16 MMOL/L — HIGH (ref 7–14)
APPEARANCE CSF: CLEAR — SIGNIFICANT CHANGE UP
B BURGDOR C6 AB SER-ACNC: NEGATIVE — SIGNIFICANT CHANGE UP
B BURGDOR IGG+IGM SER-ACNC: 0.04 INDEX — SIGNIFICANT CHANGE UP (ref 0.01–0.89)
BUN SERPL-MCNC: 14 MG/DL — SIGNIFICANT CHANGE UP (ref 10–20)
CALCIUM SERPL-MCNC: 9.3 MG/DL — SIGNIFICANT CHANGE UP (ref 8.5–10.1)
CHLORIDE SERPL-SCNC: 105 MMOL/L — SIGNIFICANT CHANGE UP (ref 98–110)
CO2 SERPL-SCNC: 20 MMOL/L — SIGNIFICANT CHANGE UP (ref 17–32)
COLOR CSF: SIGNIFICANT CHANGE UP
CREAT SERPL-MCNC: 0.8 MG/DL — SIGNIFICANT CHANGE UP (ref 0.7–1.5)
CRP SERPL-MCNC: 0.8 MG/DL — HIGH (ref 0–0.4)
GLUCOSE CSF-MCNC: 68 MG/DL — SIGNIFICANT CHANGE UP (ref 45–75)
GLUCOSE SERPL-MCNC: 103 MG/DL — HIGH (ref 70–99)
GRAM STN FLD: SIGNIFICANT CHANGE UP
HCT VFR BLD CALC: 37.8 % — SIGNIFICANT CHANGE UP (ref 37–47)
HGB BLD-MCNC: 12 G/DL — SIGNIFICANT CHANGE UP (ref 12–16)
LYME C6 AB IGG/IGM EIA REFLEX WESTERN BL: SIGNIFICANT CHANGE UP
MCHC RBC-ENTMCNC: 26.5 PG — LOW (ref 27–31)
MCHC RBC-ENTMCNC: 31.7 G/DL — LOW (ref 32–37)
MCV RBC AUTO: 83.4 FL — SIGNIFICANT CHANGE UP (ref 81–99)
NEUTROPHILS # CSF: SIGNIFICANT CHANGE UP % (ref 0–6)
NRBC # BLD: 0 /100 WBCS — SIGNIFICANT CHANGE UP (ref 0–0)
NRBC NFR CSF: 1 /UL — SIGNIFICANT CHANGE UP (ref 0–5)
PLATELET # BLD AUTO: 296 K/UL — SIGNIFICANT CHANGE UP (ref 130–400)
POTASSIUM SERPL-MCNC: 5 MMOL/L — SIGNIFICANT CHANGE UP (ref 3.5–5)
POTASSIUM SERPL-SCNC: 5 MMOL/L — SIGNIFICANT CHANGE UP (ref 3.5–5)
PROT CSF-MCNC: 22 MG/DL — SIGNIFICANT CHANGE UP (ref 15–45)
RBC # BLD: 4.53 M/UL — SIGNIFICANT CHANGE UP (ref 4.2–5.4)
RBC # CSF: 0 /UL — SIGNIFICANT CHANGE UP (ref 0–0)
RBC # FLD: 12 % — SIGNIFICANT CHANGE UP (ref 11.5–14.5)
SODIUM SERPL-SCNC: 141 MMOL/L — SIGNIFICANT CHANGE UP (ref 135–146)
SPECIMEN SOURCE: SIGNIFICANT CHANGE UP
TUBE TYPE: SIGNIFICANT CHANGE UP
WBC # BLD: 9.27 K/UL — SIGNIFICANT CHANGE UP (ref 4.8–10.8)
WBC # FLD AUTO: 9.27 K/UL — SIGNIFICANT CHANGE UP (ref 4.8–10.8)

## 2018-07-12 RX ORDER — ONDANSETRON 8 MG/1
4 TABLET, FILM COATED ORAL ONCE
Qty: 0 | Refills: 0 | Status: DISCONTINUED | OUTPATIENT
Start: 2018-07-12 | End: 2018-07-13

## 2018-07-12 RX ORDER — ACETAZOLAMIDE 250 MG/1
250 TABLET ORAL EVERY 12 HOURS
Qty: 0 | Refills: 0 | Status: DISCONTINUED | OUTPATIENT
Start: 2018-07-12 | End: 2018-07-13

## 2018-07-12 RX ORDER — ACETAMINOPHEN 500 MG
650 TABLET ORAL ONCE
Qty: 0 | Refills: 0 | Status: COMPLETED | OUTPATIENT
Start: 2018-07-12 | End: 2018-07-12

## 2018-07-12 RX ADMIN — OXYCODONE AND ACETAMINOPHEN 1 TABLET(S): 5; 325 TABLET ORAL at 13:55

## 2018-07-12 RX ADMIN — OXYCODONE AND ACETAMINOPHEN 1 TABLET(S): 5; 325 TABLET ORAL at 21:00

## 2018-07-12 RX ADMIN — Medication 650 MILLIGRAM(S): at 17:25

## 2018-07-12 RX ADMIN — OXYCODONE AND ACETAMINOPHEN 1 TABLET(S): 5; 325 TABLET ORAL at 21:55

## 2018-07-12 RX ADMIN — ACETAZOLAMIDE 250 MILLIGRAM(S): 250 TABLET ORAL at 22:10

## 2018-07-12 NOTE — CONSULT NOTE ADULT - SUBJECTIVE AND OBJECTIVE BOX
Neurology consult    GRECIA [id number]    HPI:  Pt is a 22 yo F sent to ED by neurologist Dr Fischer for admission. PT has been c/o headaches with nausea over the past year and has been getting neuro workup as outpatient and was found to have papilledema. Headache is usually localized over Right fronto-temporal area , throbbing in character , sudden in onset , associated with photophobia and ringing in ears bilaterally. Bending forward makes it worst and nothing helps it. Pt has tried multiple OTC analgesics with no improvement. Recently for the past two weeks her headaches have started to get bad which led her to seek medical assistance. Pt endorses tingling in both the feet for one day. She was using birth control pills for two years which she stopped one day back. Denies visual changes, N/V, fever. (11 Jul 2018 15:33)  Is started on Diamox 250mg po bid. HA is improved.  Was seen by ophto few weeks ago - has papilledema. Was sent by Dr. Fischer from the [facility] office for LP with opening pressure.    MEDICATIONS    oxyCODONE    5 mG/acetaminophen 325 mG 1 Tablet(s) Oral every 8 hours PRN      PAST MEDICAL & SURGICAL HISTORY:  Papilledema  Headache  No significant past surgical history       Family history: No history of dementia, strokes, or seizures   FAMILY HISTORY:  No pertinent family history in first degree relatives    SOCIAL HISTORY --     Allergies    No Known Allergies    Intolerances        Height (cm): 167.64 (07-11 @ 11:46)  Weight (kg): 108.9 (07-11 @ 11:46)  BMI (kg/m2): 38.8 (07-11 @ 11:46)    Vital Signs Last 24 Hrs  T(C): 36.4 (12 Jul 2018 07:41), Max: 36.7 (11 Jul 2018 11:46)  T(F): 97.6 (12 Jul 2018 07:41), Max: 98.1 (11 Jul 2018 11:46)  HR: 85 (12 Jul 2018 07:41) (71 - 85)  BP: 105/58 (12 Jul 2018 07:41) (91/64 - 117/87)  BP(mean): --  RR: 18 (12 Jul 2018 07:41) (17 - 19)  SpO2: 98% (12 Jul 2018 07:41) (98% - 99%)    HEADACHE  ^DR FISCHER SENT IN FOR TESTTING  No pertinent family history in first degree relatives  Handoff  MEWS Score  Papilledema  Headache  No pertinent past medical history  Headache  No significant past surgical history  DR FISCHER SENT IN FOR TESTTING  18      REVIEW OF SYSTEMS:    Constitutional: No fever, chills, fatigue, weakness  Eyes: no eye pain, visual disturbances, or discharge  ENT:  No difficulty hearing, tinnitus, vertigo; No sinus or throat pain  Neck: No pain or stiffness  Respiratory: No cough, dyspnea, wheezing   Cardiovascular: No chest pain, palpitations,   Gastrointestinal: No abdominal or epigastric pain. No nausea, vomiting  No diarrhea or constipation.   Genitourinary: No dysuria, frequency, hematuria or incontinence  Neurological: No headaches, lightheadedness, vertigo, numbness or tremors  Psychiatric: No depression, anxiety, mood swings or difficulty sleeping  Musculoskeletal: No joint pain or swelling; No muscle, back or extremity pain  Skin: No itching, burning, rashes or lesions   Lymph Nodes: No enlarged glands  Endocrine: No heat or cold intolerance; No hair loss, No h/o diabetes or thyroid dysfunction  Allergy and Immunologic: No hives or eczema    PHYSICAL EXAMINATION:  General:  Well-developed, well nourished, in no acute distress.  Eyes: Conjunctiva and sclera clear.  Cardiovascular: Regular rate and rhythm; S1 and S2 Normal; No murmurs, gallops or rubs.  Neurologic:  - Mental Status:  Alert, awake, oriented to person, place, and time; Speech is fluent with intact naming, repetition, and comprehension.  - Cranial Nerves II-XII:  PERRLA, EOMI, no nystagmus, face simmetrical, hearing intact to finger rub bl, tongue midline  - Motor:  Strength is 5/5 throughout.  There is no prontator drift.  Normal muscle bulk and tone throughout.  - Reflexes:  2+ symmetric throughuot.  Plantar responses flexor.  - Sensory:  Intact to light touch, pin prick  sense throughout.  - Coordination:  Finger-nose-finger and heel-knee-shin intact without dysmetria.  Rapid alternating hand movements intact.  - Gait:  No ataxia. Heel and toe walking are normal.  Tandem gait is normal.   Romberg testing is negative.    LABS:  CBC Full  -  ( 12 Jul 2018 05:45 )  WBC Count : 9.27 K/uL  Hemoglobin : 12.0 g/dL  Hematocrit : 37.8 %  Platelet Count - Automated : 296 K/uL  Mean Cell Volume : 83.4 fL  Mean Cell Hemoglobin : 26.5 pg  Mean Cell Hemoglobin Concentration : 31.7 g/dL  Auto Neutrophil # : x  Auto Lymphocyte # : x  Auto Monocyte # : x  Auto Eosinophil # : x  Auto Basophil # : x  Auto Neutrophil % : x  Auto Lymphocyte % : x  Auto Monocyte % : x  Auto Eosinophil % : x  Auto Basophil % : x    07-12    141  |  105  |  14  ----------------------------<  103<H>  5.0   |  20  |  0.8    Ca    9.3      12 Jul 2018 05:45    TPro  7.5  /  Alb  4.4  /  TBili  <0.2  /  DBili  <0.2  /  AST  19  /  ALT  24  /  AlkPhos  49  07-11    Hemoglobin A1C:     LIVER FUNCTIONS - ( 11 Jul 2018 10:53 )  Alb: 4.4 g/dL / Pro: 7.5 g/dL / ALK PHOS: 49 U/L / ALT: 24 U/L / AST: 19 U/L / GGT: x           Vitamin B12   PT/INR - ( 11 Jul 2018 10:53 )   PT: 11.40 sec;   INR: 1.06 ratio         PTT - ( 11 Jul 2018 10:53 )  PTT:28.5 sec    LP - pending    RADIOLOGY  MRA/MRV head - pending

## 2018-07-12 NOTE — PROGRESS NOTE ADULT - ASSESSMENT
PT has been c/o headaches with nausea over the past year and has been getting neuro workup as outpatient and was found to have papilledema.    1) Headache R/o Idiopathic intracranial Hypertension  -History of 1 year of headache, ringing in ear and papilledema  -Pt has history of birth control use  -Has high BMI  -MRA/MRV w/o contrast  - LP with OP, CSF for cells, prot, gluc, bact cx, giovany stain (bedside LP was unsuccessful will consult IR to do it)  - cont Diamox 250mg q 12h  - hypercoagulable state w/u    2) DVT prophylaxis   Encourage ambulation    3) GI prophylaxis  no need    4) dispo  from home    FULL CODE

## 2018-07-12 NOTE — PROGRESS NOTE ADULT - SUBJECTIVE AND OBJECTIVE BOX
Patient is a 23y old  Female who presents with a chief complaint of Headache (11 Jul 2018 15:33)      Overnight events:patient seen and examined. she feels well and denies headache this morning.    PAST MEDICAL & SURGICAL HISTORY:  Papilledema  Headache  No significant past surgical history      MEDICATIONS  (STANDING):    MEDICATIONS  (PRN):  oxyCODONE    5 mG/acetaminophen 325 mG 1 Tablet(s) Oral every 8 hours PRN Moderate Pain (4 - 6)      Home medications      Vital Signs Last 24 Hrs  T(C): 36.4 (12 Jul 2018 07:41), Max: 36.7 (11 Jul 2018 11:46)  T(F): 97.6 (12 Jul 2018 07:41), Max: 98.1 (11 Jul 2018 11:46)  HR: 85 (12 Jul 2018 07:41) (71 - 85)  BP: 105/58 (12 Jul 2018 07:41) (91/64 - 117/87)  BP(mean): --  RR: 18 (12 Jul 2018 07:41) (17 - 19)  SpO2: 98% (12 Jul 2018 07:41) (98% - 99%)  CAPILLARY BLOOD GLUCOSE        I&O's Summary      Physical Exam:    GENERAL: NAD, well-developed  	HEAD:  Atraumatic, Normocephalic  	EYES: EOMI, PERRLA, conjunctiva and sclera clear  	NECK: Supple, No JVD  	CHEST/LUNG: Clear to auscultation bilaterally; No wheeze  	HEART: Regular rate and rhythm; No murmurs, rubs, or gallops  	ABDOMEN: Soft, Nontender, Nondistended; Bowel sounds present  	EXTREMITIES:  2+ Peripheral Pulses, No clubbing, cyanosis, or edema  	PSYCH: AAOx3  	NEUROLOGY: non-focal  SKIN: No rashes or lesions        Labs:                        12.0   9.27  )-----------( 296      ( 12 Jul 2018 05:45 )             37.8             07-12    141  |  105  |  14  ----------------------------<  103<H>  5.0   |  20  |  0.8    Ca    9.3      12 Jul 2018 05:45    TPro  7.5  /  Alb  4.4  /  TBili  <0.2  /  DBili  <0.2  /  AST  19  /  ALT  24  /  AlkPhos  49  07-11    LIVER FUNCTIONS - ( 11 Jul 2018 10:53 )  Alb: 4.4 g/dL / Pro: 7.5 g/dL / ALK PHOS: 49 U/L / ALT: 24 U/L / AST: 19 U/L / GGT: x                 PT/INR - ( 11 Jul 2018 10:53 )   PT: 11.40 sec;   INR: 1.06 ratio         PTT - ( 11 Jul 2018 10:53 )  PTT:28.5 sec

## 2018-07-12 NOTE — CONSULT NOTE ADULT - ASSESSMENT
A 24yo F adm for headache to r/o PTC.    PLAN:  - f/u MRA/MRV w/o contrast  - LP with OP, CSF for cells, prot, gluc, bact cx, giovany stain  - cont Diamox  - will follow A 24yo F adm for headache to r/o PTC.    PLAN:  - f/u MRA/MRV w/o contrast  - LP with OP, CSF for cells, prot, gluc, bact cx, giovany stain  - cont Diamox  - hypercoagulable state w/u  - will follow

## 2018-07-12 NOTE — PROGRESS NOTE ADULT - SUBJECTIVE AND OBJECTIVE BOX
INTERVENTIONAL RADIOLOGY BRIEF-OPERATIVE NOTE    Procedure: Lumbar puncture    Pre-Op Diagnosis: Idiopathic intracranial hypertension    Post-Op Diagnosis: Same    Attending: Liss   Resident: Kay    Anesthesia (type):  [ ] General Anesthesia  [ ] Sedation  [ ] Spinal Anesthesia  [x] Local/Regional    Contrast: None.    Estimated Blood Loss: <5cc    Condition:   [ ] Critical  [ ] Serious  [ ] Fair   [x] Good    Findings/Follow up Plan of Care:  Lumbar puncture at L2-L3 under fluoroscopic guidance with 30cc CSF removed. Opening pressure= 31, closing pressure 10.     Specimens Removed: CSF.    Implants: None.    Complications: None immediate.     Disposition:    Successful lumbar puncture d/c back to floors.       Please call Interventional Radiology [phone] with any questions, concerns, or issues.

## 2018-07-13 ENCOUNTER — TRANSCRIPTION ENCOUNTER (OUTPATIENT)
Age: 23
End: 2018-07-13

## 2018-07-13 VITALS
HEART RATE: 86 BPM | DIASTOLIC BLOOD PRESSURE: 63 MMHG | OXYGEN SATURATION: 98 % | TEMPERATURE: 98 F | SYSTOLIC BLOOD PRESSURE: 107 MMHG | RESPIRATION RATE: 18 BRPM

## 2018-07-13 LAB
TSH SERPL-MCNC: 2.46 UIU/ML — SIGNIFICANT CHANGE UP (ref 0.27–4.2)
VIT B12 SERPL-MCNC: 431 PG/ML — SIGNIFICANT CHANGE UP (ref 232–1245)

## 2018-07-13 RX ORDER — SODIUM CHLORIDE 9 MG/ML
1000 INJECTION INTRAMUSCULAR; INTRAVENOUS; SUBCUTANEOUS ONCE
Qty: 0 | Refills: 0 | Status: COMPLETED | OUTPATIENT
Start: 2018-07-13 | End: 2018-07-13

## 2018-07-13 RX ORDER — ACETAZOLAMIDE 250 MG/1
1 TABLET ORAL
Qty: 0 | Refills: 0 | COMMUNITY

## 2018-07-13 RX ORDER — SODIUM CHLORIDE 9 MG/ML
1000 INJECTION INTRAMUSCULAR; INTRAVENOUS; SUBCUTANEOUS
Qty: 0 | Refills: 0 | Status: DISCONTINUED | OUTPATIENT
Start: 2018-07-13 | End: 2018-07-13

## 2018-07-13 RX ORDER — ACETAZOLAMIDE 250 MG/1
1 TABLET ORAL
Qty: 0 | Refills: 0 | COMMUNITY
Start: 2018-07-13

## 2018-07-13 RX ADMIN — SODIUM CHLORIDE 75 MILLILITER(S): 9 INJECTION INTRAMUSCULAR; INTRAVENOUS; SUBCUTANEOUS at 15:50

## 2018-07-13 RX ADMIN — SODIUM CHLORIDE 400 MILLILITER(S): 9 INJECTION INTRAMUSCULAR; INTRAVENOUS; SUBCUTANEOUS at 11:43

## 2018-07-13 RX ADMIN — ACETAZOLAMIDE 250 MILLIGRAM(S): 250 TABLET ORAL at 10:37

## 2018-07-13 NOTE — PROGRESS NOTE ADULT - SUBJECTIVE AND OBJECTIVE BOX
CHIEF COMPLAINT:    F/u on HA, PTC    INTERVAL HISTORY:    Pt had LP yesterday under IR - OP 31, closing pressure - 10. CSF - unremarkable.  Pt is c/o HA today, gets worse when stands up and improves when lays down. No new symptoms, vision is OK.  Chart reviewed    REVIEW OF SYSTEMS:  As per HPI, otherwise negative for Constitutional, Eyes, Ears/Nose/Mouth/Throat, Neck, Cardiovascular, Respiratory, Gastrointestinal, Genitourinary, Skin, Endocrine, Musculoskeletal, Psychiatric, and Hematologic/Lymphatic.    VITAL SIGNS:  Vital Signs Last 24 Hrs  T(C): 36.4 (13 Jul 2018 07:47), Max: 36.4 (12 Jul 2018 17:31)  T(F): 97.5 (13 Jul 2018 07:47), Max: 97.6 (12 Jul 2018 17:31)  HR: 69 (13 Jul 2018 07:47) (67 - 76)  BP: 101/62 (13 Jul 2018 07:47) (101/62 - 110/60)  BP(mean): --  RR: 16 (13 Jul 2018 07:59) (16 - 18)  SpO2: 99% (13 Jul 2018 07:59) (99% - 99%)    MEDICATIONS  (STANDING):  acetazolamide    Tablet 250 milliGRAM(s) Oral every 12 hours    MEDICATIONS  (PRN):  ondansetron Injectable 4 milliGRAM(s) IV Push once PRN Nausea and/or Vomiting  oxyCODONE    5 mG/acetaminophen 325 mG 1 Tablet(s) Oral every 8 hours PRN Moderate Pain (4 - 6)      PHYSICAL EXAMINATION:  General: Well-developed, well nourished, in no acute distress.  Eyes: Conjunctiva and sclera clear.  Cardiovascular: Regular rate and rhythm; S1 and S2 Normal; No murmurs, gallops or rubs.  Neurologic:  - Mental Status:  Alert, awake, oriented to person, place, and time; Speech is fluent.  - Cranial Nerves II-XII: PERRLA, EOMI, face - no droop, no nystagmus.  - Motor:  Strength is 5/5 throughout.  There is no pronator drift.    - Reflexes:  2+ throughout Plantar responses flexor bl.  - Sensory:  Intact to light touch, pin prick sense throughout.  - Coordination:  Finger-nose-finger and h/k  intact without dysmetria bl  - Gait:   Normal, no ataxia.  Romberg testing is negative.    LABS:                         12.0   9.27  )-----------( 296      ( 12 Jul 2018 05:45 )             37.8     07-12    141  |  105  |  14  ----------------------------<  103<H>  5.0   |  20  |  0.8    Ca    9.3      12 Jul 2018 05:45    Radiology:  < from: MR Venogram Head No Cont (07.13.18 @ 08:57) >  Grossly unremarkable MRV of the brain without contrast.  < end of copied text >    < from: MR Angio Head No Cont (07.13.18 @ 08:48) >  Unremarkable MRA of the head  < end of copied text >    IMPRESSION & PLAN:      Impression:		  A 22yo F adm for headache  - PTC.  - most likely post LP headache    PLAN:  - IVF, caffeine containing beverages    - Tylenol 650mg po q6hrs prn  - if HA is not improving within next 24hrs - call anesthesia for blood patch  - hold Diamox until HA gets better than resume  - when HA resolves - neurologically stable for d/c and f/u with Dr. Almendarez as OP  - f/u hypercoagulable w/u as OP  - please call us for any questions

## 2018-07-13 NOTE — PROGRESS NOTE ADULT - SUBJECTIVE AND OBJECTIVE BOX
GRECIA MONDRAGON  23y  Female      Patient is a 23y old  Female who presents with a chief complaint of Headache (13 Jul 2018 11:04)      INTERVAL HPI/OVERNIGHT EVENTS:      ******************************* REVIEW OF SYSTEMS:**********************************************  mild headache.  All other review of systems negative    *********************** VITALS ******************************************    T(F): 98.1 (07-13-18 @ 12:30)  HR: 78 (07-13-18 @ 12:30) (67 - 78)  BP: 111/68 (07-13-18 @ 12:30) (101/62 - 111/68)  RR: 18 (07-13-18 @ 12:30) (16 - 18)  SpO2: 98% (07-13-18 @ 12:30) (98% - 99%)            ******************************** PHYSICAL EXAM:**************************************************  GENERAL: NAD    PSYCH: no agitation, baseline mentation  HEENT:     NERVOUS SYSTEM:  Alert & Oriented X3, MS  5/5 B/L  UE and LE ; Sensory intact    PULMONARY: MARK, CTA    CARDIOVASCULAR: S1S2 RRR    GI: Soft, NT, ND; BS present.    EXTREMITIES:  2+ Peripheral Pulses, No clubbing, cyanosis, or edema    LYMPH: No lymphadenopathy noted    SKIN: No rashes or lesions    ******************************************************************************************    Consultant(s) Notes Reviewed:  [x ] YES  [ ] NO    Discussed with Consultants/Other Providers [ x] YES     **************************** LABS *******************************************************                          12.0   9.27  )-----------( 296      ( 12 Jul 2018 05:45 )             37.8     07-12    141  |  105  |  14  ----------------------------<  103<H>  5.0   |  20  |  0.8    Ca    9.3      12 Jul 2018 05:45            Lactate Trend        CAPILLARY BLOOD GLUCOSE              **************************Active Medications *******************************************  No Known Allergies      acetazolamide    Tablet 250 milliGRAM(s) Oral every 12 hours  ondansetron Injectable 4 milliGRAM(s) IV Push once PRN  oxyCODONE    5 mG/acetaminophen 325 mG 1 Tablet(s) Oral every 8 hours PRN  sodium chloride 0.9%. 1000 milliLiter(s) IV Continuous <Continuous>      ***************************************************  RADIOLOGY & ADDITIONAL TESTS:    Imaging Personally Reviewed:  [ ] YES  [ ] NO    HEALTH ISSUES - PROBLEM Dx:

## 2018-07-13 NOTE — DISCHARGE NOTE ADULT - CARE PLAN
Principal Discharge DX:	Idiopathic intracranial hypertension  Goal:	symptom relief  Assessment and plan of treatment:	Headache likely related to Idiopathic intracranial Hypertension  -History of 1 year of headache, ringing in ear and papilledema  -Opening pressure on LP was found to be elevated at 31, otherwise CSF studies were unremarkable  -MRA/MRV w/o contrast were unremarkable  - cont Diamox 250mg q 12h until 7/15/2018 then 500mg q 12h  - f/u hypercoagulable state w/u  - f/u with Dr Almendarez as outpatient

## 2018-07-13 NOTE — PROGRESS NOTE ADULT - ASSESSMENT
PT has been c/o headaches with nausea over the past year and has been getting neuro workup as outpatient and was found to have papilledema.    1) Headache R/o Idiopathic intracranial Hypertension  -History of 1 year of headache, ringing in ear and papilledema  - s/p  LP with  high OP, Suggestive of PseudoTomor Cerebri.  - cont Diamox 250mg q 12h  - hypercoagulable state w/u  - current headache probably 2/2 LP    2) DVT prophylaxis   Encourage ambulation    3) GI prophylaxis  no need    4) dispo  from home    FULL CODE    D/C PLANNING    F/U NEUROLOGY AS OUTPT.

## 2018-07-13 NOTE — DISCHARGE NOTE ADULT - PLAN OF CARE
symptom relief Headache likely related to Idiopathic intracranial Hypertension  -History of 1 year of headache, ringing in ear and papilledema  -Opening pressure on LP was found to be elevated at 31, otherwise CSF studies were unremarkable  -MRA/MRV w/o contrast were unremarkable  - cont Diamox 250mg q 12h until 7/15/2018 then 500mg q 12h  - f/u hypercoagulable state w/u  - f/u with Dr Almendarez as outpatient

## 2018-07-13 NOTE — DISCHARGE NOTE ADULT - CARE PROVIDER_API CALL
Daksha Almendarez), Neurology  [address] [place] [address]  Phone: [phone]  Fax: [phone]    Shahid Shin), [address] [place], [place] [id number]  [address] [place] [address]  Phone: [phone]  Fax: [phone]

## 2018-07-13 NOTE — DISCHARGE NOTE ADULT - HOSPITAL COURSE
23 F has been c/o headaches with nausea over the past year and has been getting neuro workup as outpatient and was found to have papilledema.    1) Headache likely related to Idiopathic intracranial Hypertension  -History of 1 year of headache, ringing in ear and papilledema  -LP was done by IR: Opening pressure on LP was found to be elevated at 31, otherwise CSF studies were unremarkable  -MRA/MRV w/o contrast were unremarkable  - cont Diamox 250mg q 12h until 7/15/2018 then 500mg q 12h  - f/u hypercoagulable state w/u  - f/u with Dr Almendarez as outpatient    2) DVT prophylaxis   Encourage ambulation 23 F has been c/o headaches with nausea over the past year and has been getting neuro workup as outpatient and was found to have papilledema.    # Headache likely related to Idiopathic intracranial Hypertension  -History of 1 year of headache, ringing in ear and papilledema  -LP was done by IR: Opening pressure on LP was found to be elevated at 31, otherwise CSF studies were unremarkable  -MRA/MRV w/o contrast were unremarkable  - cont Diamox 250mg q 12h until 7/15/2018 then 500mg q 12h  - f/u hypercoagulable state w/u  - f/u with Dr Almendarez as outpatient    #post LP headache   IVF, caffeine containing beverages    - Tylenol 650mg po q6hrs   - HA improved - neurologically stable for d/c and f/u with Dr. Almendarez as OP    # DVT prophylaxis   Encourage ambulation

## 2018-07-13 NOTE — DISCHARGE NOTE ADULT - MEDICATION SUMMARY - MEDICATIONS TO TAKE
I will START or STAY ON the medications listed below when I get home from the hospital:    acetaZOLAMIDE 250 mg oral tablet  -- 1 tab(s) by mouth every 12 hours until 7/15/2018 the start taking 2 tabs by mouth every 12 hours  -- Indication: For idiopathic intracranial hypertension

## 2018-07-13 NOTE — DISCHARGE NOTE ADULT - PATIENT PORTAL LINK FT
You can access the [facility] Portal, offered by [facility], by registering with the following website: [email or web address]

## 2018-07-13 NOTE — DISCHARGE NOTE ADULT - CARE PROVIDERS DIRECT ADDRESSES
,DirectAddress_Unknown,[email or web address]

## 2018-07-14 LAB — PROT C ACT/NOR PPP: 127 % — SIGNIFICANT CHANGE UP (ref 65–129)

## 2018-07-15 ENCOUNTER — EMERGENCY (EMERGENCY)
Facility: HOSPITAL | Age: 23
LOS: 0 days | Discharge: HOME | End: 2018-07-16
Attending: EMERGENCY MEDICINE | Admitting: EMERGENCY MEDICINE

## 2018-07-15 VITALS
OXYGEN SATURATION: 100 % | DIASTOLIC BLOOD PRESSURE: 79 MMHG | HEART RATE: 85 BPM | TEMPERATURE: 97 F | SYSTOLIC BLOOD PRESSURE: 114 MMHG | RESPIRATION RATE: 18 BRPM

## 2018-07-15 DIAGNOSIS — R51 HEADACHE: ICD-10-CM

## 2018-07-15 DIAGNOSIS — Z79.899 OTHER LONG TERM (CURRENT) DRUG THERAPY: ICD-10-CM

## 2018-07-15 LAB
CULTURE RESULTS: NO GROWTH — SIGNIFICANT CHANGE UP
PROT S FREE PPP-ACNC: 104 % NORMAL — SIGNIFICANT CHANGE UP (ref 60–140)
SPECIMEN SOURCE: SIGNIFICANT CHANGE UP

## 2018-07-15 RX ORDER — SODIUM CHLORIDE 9 MG/ML
1000 INJECTION INTRAMUSCULAR; INTRAVENOUS; SUBCUTANEOUS
Qty: 0 | Refills: 0 | Status: DISCONTINUED | OUTPATIENT
Start: 2018-07-15 | End: 2018-07-16

## 2018-07-15 RX ORDER — DIPHENHYDRAMINE HCL 50 MG
25 CAPSULE ORAL ONCE
Qty: 0 | Refills: 0 | Status: COMPLETED | OUTPATIENT
Start: 2018-07-15 | End: 2018-07-15

## 2018-07-15 RX ORDER — METOCLOPRAMIDE HCL 10 MG
10 TABLET ORAL ONCE
Qty: 0 | Refills: 0 | Status: COMPLETED | OUTPATIENT
Start: 2018-07-15 | End: 2018-07-15

## 2018-07-15 RX ADMIN — Medication 25 MILLIGRAM(S): at 23:50

## 2018-07-15 RX ADMIN — Medication 10 MILLIGRAM(S): at 23:48

## 2018-07-15 RX ADMIN — SODIUM CHLORIDE 500 MILLILITER(S): 9 INJECTION INTRAMUSCULAR; INTRAVENOUS; SUBCUTANEOUS at 23:49

## 2018-07-15 NOTE — ED PROVIDER NOTE - PHYSICAL EXAMINATION
CONSTITUTIONAL: Well-developed; well-nourished; in no acute distress.   SKIN: warm, dry  HEAD: Normocephalic; atraumatic, no temporal tenderness, no mastoid tenderness  EYES: PERRL, EOMI, no conjunctival erythema  ENT: No nasal discharge; airway clear, mucous membranes moist  NECK: Supple; non tender. from  CARD: +S1, S2 no murmurs, gallops, or rubs. Regular rate and rhythm.   RESP: No wheezes, rales or rhonchi. CTABL  EXT: moves all extremities, ambulates wo assistance No clubbing, cyanosis or edema. ue dermatomes/myotomes grossly intact  NEURO: Alert, oriented, grossly unremarkable.   gait steady, finger to nose testing unremarkable b/l, heel to shin testing b/l wnl, speech clear, cn ii-xii grossly intact   PSYCH: Cooperative, appropriate.  back: no midline tenderness, no erytehma

## 2018-07-15 NOTE — ED PROVIDER NOTE - NS ED ROS FT
General: No fevers, chills,  Eyes:  No visual changes, eye pain   ENMT:  No hearing changes, pain,   Cardiac:  No chest pain, SOB or edema.   Respiratory:  No cough or respiratory distress.   GI:  no abdominal pain.  :  No dysuria, frequency or burning.  MS:  No muscle weakness, joint pain or back pain.  Neuro:  +headache  Skin:  No skin rash.   Endocrine: No history of thyroid disease or diabetes.

## 2018-07-15 NOTE — ED PROVIDER NOTE - PROGRESS NOTE DETAILS
pt reevaluated, feeling better. no headache event when provoked. dc plan, return precautions and need for f/u discussed

## 2018-07-15 NOTE — ED ADULT TRIAGE NOTE - CHIEF COMPLAINT QUOTE
I recently had 2 spinal taps and im still having bad headaches and I cant sit or delores d for a long period of time

## 2018-07-15 NOTE — ED PROVIDER NOTE - MEDICAL DECISION MAKING DETAILS
23f w idiopathic cranial HTN and HA w recent LP to drain CSF. nonfocal neuro exam. Analgesia given w improvement in symptoms. Pt advised regarding symptomatic/supportive care, importance of PMD/Neuro f/u, and symptoms to prompt ED return.

## 2018-07-15 NOTE — ED PROVIDER NOTE - OBJECTIVE STATEMENT
---incomplete    24yo F no sig pmhx pw headache and low back pain, recently dx idiopathic cranial htn. headache has been going on for about a year now, neurologist (?) ordered ct head, mri, mra, mrv, no pathology, pt had LP done in past month that had elevated opening pressure, since then pt has been directed to stop OCPs. pt also reprots low back pain, states sitting and standing exacerbates headache. lb pain began after LP. take anything?    ROS  ?    PE  CN intact, ue dermatomes and myotomes intact and intact to sharp and dull sensation, patellar refelx wnl, perrla eomi 24yo F pmhx idiopathic cranial htn, recently d/c ocps pw headache b/l posterior head exacerbated by standing for long periods of times. headache relieved by lying flat. pt had LP to drain csf 3 days ago, states headache started after this. not the worst headache of her life, no photophobia, no vomiting. pt states headache is relieved by otc pain medication. no fevers, no changes in vision or hearing.

## 2018-07-15 NOTE — ED PROVIDER NOTE - ATTENDING CONTRIBUTION TO CARE
23f w a hx of idiopathic cranial HTN s/p CSF drainage 3 days prior. Pt presents reporting gradual onset/progressive HA x3 days similar to prior. Pain is dull, moderate, & constant. Pt has had recent MRA/MRV.    Review of Systems  Constitutional:  No fever or chills.   Eyes:  No visual changes, diplopia, eye pain, or discharge.  ENMT:  No nasal congestion, discharge, or throat pain.  Cardiac:  No chest pain, syncope, or edema.  Respiratory:  No dyspnea, wheezing, or cough. No hemoptysis.  GI:  No vomiting, diarrhea, or abdominal pain. No melena or hematochezia.  :  No dysuria or hematuria.   Musculoskeletal:  Nogait abnormality, joint swelling, joint pain, or back pain.  Skin:  No skin rash, jaundice, or lesions.  Neuro:  No dizziness, loss of sensation, or focal weakness.  No change in mental status.     Physical Exam  General: Awake, alert, NAD, WDWN, non-toxic appearing, NCAT  Eyes: PERRL, EOMI, no icterus, lids and conjunctivae are normal  ENT: External inspection normal, pink/moist membranes, pharynx normal, no sinus/TMJ/dental/temporal/mastoid tender.  CV: S1S2, regular rate and rhythm, no murmur/gallops/rubs, no JVD, 2+ pulses b/l, no edema/cords/homans, warm/well-perfused  Respiratory: Normal respiratory rate/effort, no respiratory distress, normal voice, speaking full sentences, lungs clear to auscultation b/l, no wheezing/rales/rhonchi, no retractions, no stridor  Abdomen: Soft abdomen, no tender/distended/guarding/rebound, no CVA tender  Musculoskeletal: FROM all 4 extremities, N/V intact, stable gait  Neck: FROM neck, supple, no meningismus, trachea midline, no JVD, no cspine tender/step-offs  Integumentary: Color normal for race, warm and dry, no rash  Neuro: Oriented x3, CN 2-12 intact, normal motor, normal sensory, normal gait, normal cerebellar  Psych: Oriented x3, mood normal, affect normal     23f w idiopathic cranial HTN and HA w recent LP to drain CSF. nontoxic appearing, n/v intact, nonfocal neuro exam. low suspicion of intracranial hemorrhage or meningoencephalitis infection. --Analgesia as needed, observe/re-assess. 23f w a hx of idiopathic cranial HTN s/p CSF drainage 3 days prior. Pt presents reporting gradual onset/progressive HA x3 days similar to prior. Pain is dull, moderate, & constant. Pt has had recent MRA/MRV.    Review of Systems  Constitutional:  No fever or chills.   Eyes:  No visual changes, diplopia, eye pain, or discharge.  ENMT:  No nasal congestion, discharge, or throat pain.  Cardiac:  No chest pain, syncope, or edema.  Respiratory:  No dyspnea, wheezing, or cough. No hemoptysis.  GI:  No vomiting, diarrhea, or abdominal pain. No melena or hematochezia.  :  No dysuria or hematuria.   Musculoskeletal:  No gait abnormality, joint swelling, joint pain, or back pain.  Skin:  No skin rash, jaundice, or lesions.  Neuro:  No dizziness, loss of sensation, or focal weakness.  No change in mental status.     Physical Exam  General: Awake, alert, NAD, WDWN, non-toxic appearing, NCAT  Eyes: PERRL, EOMI, no icterus, lids and conjunctivae are normal  ENT: External inspection normal, pink/moist membranes, pharynx normal, no sinus/TMJ/dental/temporal/mastoid tender.  CV: S1S2, regular rate and rhythm, no murmur/gallops/rubs, no JVD, 2+ pulses b/l, no edema/cords/homans, warm/well-perfused  Respiratory: Normal respiratory rate/effort, no respiratory distress, normal voice, speaking full sentences, lungs clear to auscultation b/l, no wheezing/rales/rhonchi, no retractions, no stridor  Abdomen: Soft abdomen, no tender/distended/guarding/rebound, no CVA tender  Musculoskeletal: FROM all 4 extremities, N/V intact, stable gait  Neck: FROM neck, supple, no meningismus, trachea midline, no JVD, no cspine tender/step-offs  Integumentary: Color normal for race, warm and dry, no rash  Neuro: Oriented x3, CN 2-12 intact, normal motor, normal sensory, normal gait, normal cerebellar  Psych: Oriented x3, mood normal, affect normal     23f w idiopathic cranial HTN and HA w recent LP to drain CSF. nontoxic appearing, n/v intact, nonfocal neuro exam. low suspicion of intracranial hemorrhage or meningoencephalitis infection. --Analgesia as needed, observe/re-assess.

## 2018-07-16 LAB
AT III ACT/NOR PPP CHRO: 95 % — SIGNIFICANT CHANGE UP (ref 85–135)
CARDIOLIPIN AB SER-ACNC: NEGATIVE — SIGNIFICANT CHANGE UP
FACT V ACT/NOR PPP: 81 % — SIGNIFICANT CHANGE UP (ref 50–150)
PROT S FREE AG PPP IA-ACNC: 157 % — HIGH (ref 61–131)

## 2018-07-16 NOTE — ED ADULT NURSE NOTE - OBJECTIVE STATEMENT
22yo F pmhx idiopathic cranial htn, recently d/c ocps pw headache b/l posterior head exacerbated by standing for long periods of times. headache relieved by lying flat. pt had LP to drain csf 3 days ago, states headache started after this. not the worst headache of her life, no photophobia, no vomiting. pt states headache is relieved by otc pain medication. no fevers, no changes in vision or hearing.

## 2018-07-17 LAB — APCR PPP: 2.62 RATIO — SIGNIFICANT CHANGE UP

## 2018-07-18 LAB
B2 GLYCOPROT1 AB SER QL: NEGATIVE — SIGNIFICANT CHANGE UP
PROT S FREE PPP-ACNC: 99 % NORMAL — SIGNIFICANT CHANGE UP (ref 60–140)

## 2018-07-19 DIAGNOSIS — G97.1 OTHER REACTION TO SPINAL AND LUMBAR PUNCTURE: ICD-10-CM

## 2018-07-19 DIAGNOSIS — G93.2 BENIGN INTRACRANIAL HYPERTENSION: ICD-10-CM

## 2018-07-19 DIAGNOSIS — H47.11 PAPILLEDEMA ASSOCIATED WITH INCREASED INTRACRANIAL PRESSURE: ICD-10-CM

## 2018-07-19 DIAGNOSIS — R51 HEADACHE: ICD-10-CM

## 2018-07-19 LAB — PTR INTERPRETATION: SIGNIFICANT CHANGE UP

## 2018-11-09 ENCOUNTER — EMERGENCY (EMERGENCY)
Facility: HOSPITAL | Age: 23
LOS: 0 days | Discharge: HOME | End: 2018-11-09
Attending: STUDENT IN AN ORGANIZED HEALTH CARE EDUCATION/TRAINING PROGRAM | Admitting: STUDENT IN AN ORGANIZED HEALTH CARE EDUCATION/TRAINING PROGRAM

## 2018-11-09 VITALS
OXYGEN SATURATION: 100 % | RESPIRATION RATE: 18 BRPM | SYSTOLIC BLOOD PRESSURE: 127 MMHG | DIASTOLIC BLOOD PRESSURE: 77 MMHG | TEMPERATURE: 98 F | HEART RATE: 87 BPM

## 2018-11-09 VITALS
DIASTOLIC BLOOD PRESSURE: 80 MMHG | SYSTOLIC BLOOD PRESSURE: 130 MMHG | HEART RATE: 102 BPM | TEMPERATURE: 97 F | HEIGHT: 66 IN | RESPIRATION RATE: 18 BRPM | OXYGEN SATURATION: 99 % | WEIGHT: 240.08 LBS

## 2018-11-09 DIAGNOSIS — R10.2 PELVIC AND PERINEAL PAIN: ICD-10-CM

## 2018-11-09 PROBLEM — R51 HEADACHE: Chronic | Status: ACTIVE | Noted: 2018-06-22

## 2018-11-09 PROBLEM — H47.10 UNSPECIFIED PAPILLEDEMA: Chronic | Status: ACTIVE | Noted: 2018-06-22

## 2018-11-09 LAB
APPEARANCE UR: ABNORMAL
BILIRUB UR-MCNC: NEGATIVE — SIGNIFICANT CHANGE UP
COLOR SPEC: YELLOW — SIGNIFICANT CHANGE UP
DIFF PNL FLD: NEGATIVE — SIGNIFICANT CHANGE UP
GLUCOSE UR QL: NEGATIVE MG/DL — SIGNIFICANT CHANGE UP
GRAN CASTS # UR COMP ASSIST: ABNORMAL /LPF
KETONES UR-MCNC: NEGATIVE — SIGNIFICANT CHANGE UP
LEUKOCYTE ESTERASE UR-ACNC: NEGATIVE — SIGNIFICANT CHANGE UP
NITRITE UR-MCNC: NEGATIVE — SIGNIFICANT CHANGE UP
PH UR: 7.5 — SIGNIFICANT CHANGE UP (ref 5–8)
PROT UR-MCNC: NEGATIVE MG/DL — SIGNIFICANT CHANGE UP
SP GR SPEC: 1.02 — SIGNIFICANT CHANGE UP (ref 1.01–1.03)
UROBILINOGEN FLD QL: 1 MG/DL (ref 0.2–0.2)

## 2018-11-09 RX ORDER — DOCUSATE SODIUM 100 MG
0 CAPSULE ORAL
Qty: 0 | Refills: 0 | COMMUNITY

## 2018-11-09 NOTE — ED PROVIDER NOTE - ATTENDING CONTRIBUTION TO CARE
22 y/o F p/w b/l pelvic pain x2d.  NO dysuria, trauma, vag discharge, fever.  ros pe above, except pt has no ttp on my exam.  UA neg.  NO acute ED events US + cysts, no torsion or mass.  IMP: pelvic pain, improved.  Pt stable for dc w/ PMD GYNf/up, and care as discussed.  Pt/ family understands plan and signs and symptoms for ED return.

## 2018-11-09 NOTE — ED PROVIDER NOTE - PROGRESS NOTE DETAILS
UA negative for infection and pt not pregnant. Pending US pelvis to r/o torsion.  exam chaperoned by PCA Kelsi: normal discharge, no blood, no CMT, no adnexal tenderness.  UA negative for infection and pt not pregnant. Pending US pelvis to r/o torsion. US pelvis shows 2 R ovarian cysts largest 1.8cm and free fluid likely 2/2 midcycle physiology. Pt is stable for outpt OB f/u with Dr. Serna.

## 2018-11-09 NOTE — ED PROVIDER NOTE - NS ED ROS FT
Constitutional:  No fever, chills, lethargy, or abnormal weight loss  Eyes:  No eye pain or visual changes  ENMT: No nasal discharge, no toothache, no sore throat. No neck pain or stiffness  Cardiac:  No chest pain or palpitations  Respiratory:  No cough or respiratory distress.   GI:  No nausea, vomiting, diarrhea or abdominal pain.  :  No dysuria, frequency or burning. + pelvic pain  MS:  No back or joint pain.  Neuro:  No headache. No numbness, weakness, or tingling.   Skin:  No skin rash  Except as documented in the HPI,  all other systems are negative

## 2018-11-09 NOTE — ED PROVIDER NOTE - PHYSICAL EXAMINATION
CONSTITUTIONAL: well-appearing, in NAD  SKIN: Warm dry, normal skin turgor  HEAD: NCAT  EYES: PERRLA, no scleral icterus  ENT: normal dental exam, normal pharynx with no erythema or exudates  NECK: Supple; non tender. Full ROM.  CARD: RRR, no murmurs.  RESP: clear to ausculation b/l.  No rales, rhonchi, or wheezing.  ABD: soft, + BS, b/l pelvic tenderness, non-distended, no rebound or guarding. No CVA tenderness  EXT: Full ROM, no bony tenderness, no pedal edema, no calf tenderness  NEURO: normal motor. normal sensory. Normal gait.  PSYCH: Cooperative, appropriate.

## 2018-11-09 NOTE — ED PROVIDER NOTE - CARE PROVIDER_API CALL
Daniel Serna), Obstetrics and Gynecology  [address] [place] [address]  Phone: [phone]  Fax: [phone]

## 2018-11-09 NOTE — ED ADULT NURSE NOTE - NSIMPLEMENTINTERV_GEN_ALL_ED
Implemented All Fall Risk Interventions:  [place] to call system. Call bell, personal items and telephone within reach. Instruct patient to call for assistance. Room bathroom lighting operational. Non-slip footwear when patient is off stretcher. Physically safe environment: no spills, clutter or unnecessary equipment. Stretcher in lowest position, wheels locked, appropriate side rails in place. Provide visual cue, wrist band, yellow gown, etc. Monitor gait and stability. Monitor for mental status changes and reorient to person, place, and time. Review medications for side effects contributing to fall risk. Reinforce activity limits and safety measures with patient and family.

## 2018-11-09 NOTE — ED PROVIDER NOTE - MEDICAL DECISION MAKING DETAILS
24 y/o F p/w b/l pelvic pain x2d.  NO dysuria, trauma, vag discharge, fever.  ros pe above, except pt has no ttp on my exam.  UA neg.  NO acute ED events US + cysts, no torsion or mass.  IMP: pelvic pain, improved.  Pt stable for dc w/ PMD GYNf/up, and care as discussed.  Pt/ family understands plan and signs and symptoms for ED return.

## 2018-11-09 NOTE — ED PROVIDER NOTE - OBJECTIVE STATEMENT
23 y.o  F w/ PMHx b/l ovarian cysts, pseudotumor cerebri p/w pelvic pain that began 2 days ago and worsened today. Pt states pain began as a dull pain but today had episodes of stabbing episodes on the both sides but mostly the right. LMP 10/25. Pt's menstrual cycle is regular but heavier than normal since she stopped using OCP. Pt has 1 sexual partner and does not use contraception. No history of STI. No N/V/D, no urinary symptoms, DOMINIQUE this AM and pt tolerated food well. No fevers, no CP, no SOB, no vaginal discharge.

## 2019-02-12 ENCOUNTER — APPOINTMENT (OUTPATIENT)
Dept: HEMATOLOGY ONCOLOGY | Facility: CLINIC | Age: 24
End: 2019-02-12

## 2019-02-12 ENCOUNTER — OUTPATIENT (OUTPATIENT)
Dept: OUTPATIENT SERVICES | Facility: HOSPITAL | Age: 24
LOS: 1 days | Discharge: HOME | End: 2019-02-12

## 2019-02-12 VITALS
HEIGHT: 66 IN | BODY MASS INDEX: 39.53 KG/M2 | DIASTOLIC BLOOD PRESSURE: 78 MMHG | WEIGHT: 246 LBS | TEMPERATURE: 97.4 F | SYSTOLIC BLOOD PRESSURE: 121 MMHG

## 2019-02-12 DIAGNOSIS — Z82.3 FAMILY HISTORY OF STROKE: ICD-10-CM

## 2019-02-12 DIAGNOSIS — Z78.9 OTHER SPECIFIED HEALTH STATUS: ICD-10-CM

## 2019-02-12 NOTE — CONSULT LETTER
[Dear  ___] : Dear  [unfilled], [Consult Letter:] : I had the pleasure of evaluating your patient, [unfilled]. [Please see my note below.] : Please see my note below. [Consult Closing:] : Thank you very much for allowing me to participate in the care of this patient.  If you have any questions, please do not hesitate to contact me. [Sincerely,] : Sincerely, [FreeTextEntry2] : Dr Rohit Mckeon MD\par [address] [place] [address]\par [place] [address]\par [phone] [FreeTextEntry3] : Adrian Shelley DO\par Attending Physician,\par Hematology/ Medical Oncology\par [phone] office\par \par

## 2019-02-12 NOTE — ASSESSMENT
[FreeTextEntry1] : 24 yo woman with pseudotumor cerebri was sent for evaluation of elevated of protein S. Not clear why the test was done/ Repeat test in February 2019 was normal. The rest of labs including WBC/diff, RBC,platelets are WNL\par \par RTC PRN

## 2019-02-12 NOTE — HISTORY OF PRESENT ILLNESS
[de-identified] : 22 yo woman has pseudotumor cerebri diagnosed in the summer of 2018. For unclear reasons, she had hypercoagulability w/u sent. Protein S was found to be elevated mildly at 157. Repeat test was normal. No personal history of venous or arterial thrombosis. Mother had stroke.

## 2019-02-15 DIAGNOSIS — D68.9 COAGULATION DEFECT, UNSPECIFIED: ICD-10-CM

## 2019-07-22 ENCOUNTER — OUTPATIENT (OUTPATIENT)
Dept: OUTPATIENT SERVICES | Facility: HOSPITAL | Age: 24
LOS: 1 days | Discharge: HOME | End: 2019-07-22

## 2019-07-23 DIAGNOSIS — E03.9 HYPOTHYROIDISM, UNSPECIFIED: ICD-10-CM

## 2019-07-23 DIAGNOSIS — E11.9 TYPE 2 DIABETES MELLITUS WITHOUT COMPLICATIONS: ICD-10-CM

## 2019-07-23 DIAGNOSIS — D64.9 ANEMIA, UNSPECIFIED: ICD-10-CM

## 2019-07-23 DIAGNOSIS — N18.2 CHRONIC KIDNEY DISEASE, STAGE 2 (MILD): ICD-10-CM

## 2019-08-26 ENCOUNTER — APPOINTMENT (OUTPATIENT)
Dept: CARDIOLOGY | Facility: CLINIC | Age: 24
End: 2019-08-26
Payer: MEDICAID

## 2019-08-26 VITALS
SYSTOLIC BLOOD PRESSURE: 118 MMHG | BODY MASS INDEX: 39.86 KG/M2 | WEIGHT: 248 LBS | HEIGHT: 66 IN | HEART RATE: 83 BPM | DIASTOLIC BLOOD PRESSURE: 68 MMHG

## 2019-08-26 DIAGNOSIS — Z87.898 PERSONAL HISTORY OF OTHER SPECIFIED CONDITIONS: ICD-10-CM

## 2019-08-26 PROCEDURE — 93000 ELECTROCARDIOGRAM COMPLETE: CPT

## 2019-08-26 PROCEDURE — 99204 OFFICE O/P NEW MOD 45 MIN: CPT

## 2019-08-26 RX ORDER — ACETAZOLAMIDE 125 MG
125 TABLET ORAL TWICE DAILY
Refills: 0 | Status: ACTIVE | COMMUNITY

## 2019-08-26 NOTE — PHYSICAL EXAM
[General Appearance - Well Developed] : well developed [Normal Appearance] : normal appearance [Well Groomed] : well groomed [General Appearance - Well Nourished] : well nourished [No Deformities] : no deformities [General Appearance - In No Acute Distress] : no acute distress [Normal Conjunctiva] : the conjunctiva exhibited no abnormalities [Normal Oral Mucosa] : normal oral mucosa [] : no respiratory distress [Respiration, Rhythm And Depth] : normal respiratory rhythm and effort [FreeTextEntry1] : no JVD [Exaggerated Use Of Accessory Muscles For Inspiration] : no accessory muscle use [Auscultation Breath Sounds / Voice Sounds] : lungs were clear to auscultation bilaterally [Murmurs] : no murmurs present [Heart Sounds] : normal S1 and S2 [Heart Rate And Rhythm] : heart rate and rhythm were normal [Edema] : no peripheral edema present [Bowel Sounds] : normal bowel sounds [Abnormal Walk] : normal gait [Abdomen Soft] : soft [Nail Clubbing] : no clubbing of the fingernails [Cyanosis, Localized] : no localized cyanosis [Skin Color & Pigmentation] : normal skin color and pigmentation [Skin Turgor] : normal skin turgor [Oriented To Time, Place, And Person] : oriented to person, place, and time [Affect] : the affect was normal

## 2019-08-26 NOTE — ASSESSMENT
[FreeTextEntry1] : Palpitations\par Chest discomfort \par tachycardia\par \par Obtain Holter.\par Obtain 2d echo to rule out structural heart disease\par Exercise stress test to evaluate for exercise inducible arrhythmia\par Hydration, weight loss, diet discussed.\par F/u with neurology.\par return after the tests.

## 2019-08-26 NOTE — HISTORY OF PRESENT ILLNESS
[FreeTextEntry1] : 25 y/o female with history of pseudotumor cerebri, presenting for evaluation of chest discomfort, intermittent palpitations. No syncope. Palpitations lasting a few seconds to minutes. She had episodes of tachycardia. sinus tachy to 120's when checked at work (works at [employer]). No edema. Recent TFT's - normal.

## 2019-09-11 ENCOUNTER — APPOINTMENT (OUTPATIENT)
Dept: CARDIOLOGY | Facility: CLINIC | Age: 24
End: 2019-09-11
Payer: MEDICAID

## 2019-09-11 PROCEDURE — 93224 XTRNL ECG REC UP TO 48 HRS: CPT

## 2019-09-12 ENCOUNTER — APPOINTMENT (OUTPATIENT)
Dept: CARDIOLOGY | Facility: CLINIC | Age: 24
End: 2019-09-12
Payer: MEDICAID

## 2019-09-12 PROCEDURE — 93015 CV STRESS TEST SUPVJ I&R: CPT

## 2019-09-12 PROCEDURE — 93306 TTE W/DOPPLER COMPLETE: CPT

## 2019-09-30 ENCOUNTER — APPOINTMENT (OUTPATIENT)
Dept: CARDIOLOGY | Facility: CLINIC | Age: 24
End: 2019-09-30
Payer: COMMERCIAL

## 2019-09-30 VITALS
HEIGHT: 66 IN | BODY MASS INDEX: 40.5 KG/M2 | DIASTOLIC BLOOD PRESSURE: 70 MMHG | WEIGHT: 252 LBS | SYSTOLIC BLOOD PRESSURE: 110 MMHG

## 2019-09-30 DIAGNOSIS — R00.2 PALPITATIONS: ICD-10-CM

## 2019-09-30 DIAGNOSIS — R07.89 OTHER CHEST PAIN: ICD-10-CM

## 2019-09-30 PROCEDURE — 99213 OFFICE O/P EST LOW 20 MIN: CPT

## 2019-09-30 NOTE — PHYSICAL EXAM
[General Appearance - Well Developed] : well developed [Normal Appearance] : normal appearance [General Appearance - Well Nourished] : well nourished [Well Groomed] : well groomed [No Deformities] : no deformities [General Appearance - In No Acute Distress] : no acute distress [Normal Oral Mucosa] : normal oral mucosa [Normal Conjunctiva] : the conjunctiva exhibited no abnormalities [] : no respiratory distress [FreeTextEntry1] : no JVD [Exaggerated Use Of Accessory Muscles For Inspiration] : no accessory muscle use [Respiration, Rhythm And Depth] : normal respiratory rhythm and effort [Heart Rate And Rhythm] : heart rate and rhythm were normal [Auscultation Breath Sounds / Voice Sounds] : lungs were clear to auscultation bilaterally [Heart Sounds] : normal S1 and S2 [Edema] : no peripheral edema present [Murmurs] : no murmurs present [Bowel Sounds] : normal bowel sounds [Abdomen Soft] : soft [Abnormal Walk] : normal gait [Nail Clubbing] : no clubbing of the fingernails [Skin Color & Pigmentation] : normal skin color and pigmentation [Cyanosis, Localized] : no localized cyanosis [Skin Turgor] : normal skin turgor [Affect] : the affect was normal [Oriented To Time, Place, And Person] : oriented to person, place, and time

## 2019-09-30 NOTE — ASSESSMENT
[FreeTextEntry1] : Palpitations\par Chest discomfort \par tachycardia\par \par Holter, 2d echo and exercise stress test were negative.\par Hydration, weight loss, diet discussed.\par F/u with neurology.\par return prn

## 2019-09-30 NOTE — HISTORY OF PRESENT ILLNESS
[FreeTextEntry1] : 25 y/o female with history of pseudotumor cerebri, presenting for f/u of chest discomfort, intermittent palpitations. No syncope.  Recent TFT's - normal. She had cardiac w/u, including stress test, Echo and Holter - all within normal limits.

## 2020-01-06 ENCOUNTER — APPOINTMENT (OUTPATIENT)
Dept: GASTROENTEROLOGY | Facility: CLINIC | Age: 25
End: 2020-01-06

## 2020-04-25 ENCOUNTER — MESSAGE (OUTPATIENT)
Age: 25
End: 2020-04-25

## 2020-08-04 ENCOUNTER — TRANSCRIPTION ENCOUNTER (OUTPATIENT)
Age: 25
End: 2020-08-04

## 2020-09-03 ENCOUNTER — TRANSCRIPTION ENCOUNTER (OUTPATIENT)
Age: 25
End: 2020-09-03

## 2020-09-14 ENCOUNTER — TRANSCRIPTION ENCOUNTER (OUTPATIENT)
Age: 25
End: 2020-09-14

## 2020-11-14 ENCOUNTER — EMERGENCY (EMERGENCY)
Facility: HOSPITAL | Age: 25
LOS: 0 days | Discharge: HOME | End: 2020-11-14
Attending: EMERGENCY MEDICINE | Admitting: EMERGENCY MEDICINE
Payer: MEDICAID

## 2020-11-14 VITALS
HEART RATE: 98 BPM | HEIGHT: 66 IN | OXYGEN SATURATION: 99 % | RESPIRATION RATE: 18 BRPM | WEIGHT: 229.94 LBS | DIASTOLIC BLOOD PRESSURE: 70 MMHG | TEMPERATURE: 98 F | SYSTOLIC BLOOD PRESSURE: 117 MMHG

## 2020-11-14 DIAGNOSIS — M25.529 PAIN IN UNSPECIFIED ELBOW: ICD-10-CM
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
## 2020-11-14 DIAGNOSIS — S59.901A UNSPECIFIED INJURY OF RIGHT ELBOW, INITIAL ENCOUNTER: ICD-10-CM
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
## 2020-11-14 PROCEDURE — 73090 X-RAY EXAM OF FOREARM: CPT | Mod: 26,RT

## 2020-11-14 PROCEDURE — 99284 EMERGENCY DEPT VISIT MOD MDM: CPT | Mod: 25

## 2020-11-14 PROCEDURE — 73070 X-RAY EXAM OF ELBOW: CPT | Mod: 26,RT

## 2020-11-14 RX ORDER — IBUPROFEN 200 MG
600 TABLET ORAL ONCE
Refills: 0 | Status: COMPLETED | OUTPATIENT
Start: 2020-11-14 | End: 2020-11-14

## 2020-11-14 RX ORDER — ACETAZOLAMIDE 250 MG/1
1 TABLET ORAL
Qty: 0 | Refills: 0 | DISCHARGE

## 2020-11-14 RX ADMIN — Medication 600 MILLIGRAM(S): at 01:19
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
## 2020-11-14 NOTE — ED PROVIDER NOTE - NS ED ROS FT
Constitutional: no fever, chills, no recent weight loss, change in appetite or malaise  MS: see HPI  Neuro: No headache or weakness. No LOC.  Skin: No skin rash.  Endocrine: No history of thyroid disease or diabetes.

## 2020-11-14 NOTE — ED PROVIDER NOTE - PHYSICAL EXAMINATION
CONSTITUTIONAL: Well-appearing; well-nourished; in no apparent distress.    MS: right elbow with full passive ROM and pain worsen on extension over 160 deg. tenderness to olecranon and medial aspect of right elbow non-tender to right shoulder/wrist and hand. right hand/wrist with FROM. right hand n/v intact.   SKIN: Normal for age and race; warm; dry; good turgor; no apparent lesions or exudate.   NEURO/PSYCH: A & O x 4; grossly unremarkable.

## 2020-11-14 NOTE — ED PROVIDER NOTE - NS ED MD DISPO DISCHARGE
1-continued dry cough  2-on antibiotics  3-has seen GI Dr. Reece  4-endoscopy on hold for current illness  5-continue antibiotic  6-follow up HR CT   7-results appointment end of January   
Home

## 2020-11-14 NOTE — ED PROVIDER NOTE - PATIENT PORTAL LINK FT
You can access the FollowMyHealth Patient Portal offered by [facility] by registering at the following website: [email or web address]. By joining [facility]’s FollowMyHealth portal, you will also be able to view your health information using other applications (apps) compatible with our system.

## 2020-11-14 NOTE — ED PROVIDER NOTE - NSFOLLOWUPINSTRUCTIONS_ED_ALL_ED_FT
Elbow injury and cannot rule out Radial Head Fracture    A radial head fracture is a break in the smaller bone in your forearm (radius). There are two bones in your forearm. The radius, or radial bone, is the bone on the side of your thumb. The fracture is located at the head of the bone, which is at the elbow joint. This usually happens because of an injury, such as falling on an outstretched arm.  There are different types of radial head fractures.     They are determined by the amount of movement (displacement) of bones from their normal positions:  Type 1. This is a small fracture in which the bone pieces remain together (nondisplaced fracture).  Type 2. The fracture is moderate, and bone pieces are slightly displaced.  Type 3. There may be multiple fractures and displaced bone pieces.  Often, a dislocated elbow happens at the same time as a radial head fracture.    What are the causes?  This condition is commonly caused by falling and landing on an outstretched arm.  What increases the risk?  This condition is more likely to develop in:  Women.  People who are 30–40 years old.    What are the signs or symptoms?  Symptoms of this condition may include:  Swelling of the elbow joint and pain on the outside of the elbow.  Pain and difficulty when bending or straightening the elbow.  Pain and difficulty when turning the palm of the hand up or down with the elbow bent.    How is this diagnosed?  This condition is diagnosed based on a physical exam and your medical history. You may have X-rays to confirm the type of fracture.    How is this treated?  Treatment for this condition includes resting, icing, and raising (elevating) the injured area above the level of your heart. You may be given medicines to help relieve pain.  Treatment varies depending on the type of fracture you have. If you have a Type 1 fracture, you may be given a splint or sling to keep your arm and elbow from moving (immobilization) for up to 5 days.  If you have a Type 2 fracture, treatment varies depending on how much displacement there is. You may be given a splint or sling to keep your arm and elbow from moving (immobilization) for up to 5 days. If the displacement is more severe, you may need surgery. Surgery may include:  Removing bone pieces.  Putting pins or screws into the head of the radius to hold it in place so it heals correctly.  Removing the entire radial head. The elbow will still be able to function normally.  If you have a Type 3 fracture, you will usually need surgery to have bone pieces removed. The entire radial head may need to be removed if the damage is severe.  Follow these instructions at home:    If you have a splint or sling:   Wear the splint or sling as told by your health care provider. Remove it only as told by your health care provider.  Loosen the splint if your fingers tingle, become numb, or turn cold and blue.  Keep the splint or sling clean and dry.    If you have a cast:   Do not stick anything inside the cast to scratch your skin. Doing that increases your risk of infection.  Check the skin around the cast every day. Tell your health care provider about any concerns.  You may put lotion on dry skin around the edges of the cast. Do not apply lotion to the skin underneath the cast.    Bathing   Do not take baths, swim, or use a hot tub until your health care provider approves. Ask your health care provider if you can take showers. You may only be allowed to take sponge baths for bathing.  If your health care provider approves bathing and showering, you may need to cover the cast or splint with a plastic bag to protect it from water.  Managing pain, stiffness, and swelling     If directed, apply ice to the injured area.  Put ice in a plastic bag.Place a towel between your skin and the bag.  Leave the ice on for 20 minutes, 2–3 times a day.  Move your fingers often to avoid stiffness and to lessen swelling.  Elevate the injured area above the level of your heart while you are sitting or lying down.    Driving   Do not drive or operate heavy machinery while taking prescription pain medicine.  Ask your health care provider when it is safe to drive if you have a cast, splint, or sling on your arm.    Activity   Return to your normal activities as told by your health care provider. Ask your health care provider what activities are safe for you.  Do exercises as told by your health care provider or physical therapist.    General instructions   Take over-the-counter and prescription medicines only as told by your health care provider.  Do not put pressure on any part of the cast or splint until it is fully hardened. This may take several hours.  Do not use any tobacco products, such as cigarettes, chewing tobacco, and e-cigarettes. Tobacco can delay bone healing. If you need help quitting, ask your health care provider.  Keep all follow-up visits as told by your health care provider. This is important.    Contact a health care provider if:  You have problems with your cast or splint.  You have pain or swelling that gets worse.    Get help right away if:  You have severe pain when you stretch your fingers.  You have fluid or a bad smell coming from your splint.  Your hand or fingers get cold or turn pale or blue.  You lose feeling in any part of your hand or arm.  This information is not intended to replace advice given to you by your health care provider. Make sure you discuss any questions you have with your health care provider.

## 2020-11-14 NOTE — ED ADULT TRIAGE NOTE - DOMESTIC TRAVEL HIGH RISK QUESTION
Refill request:  LOV: 8/3/17 -RTC 3-4 months  NOV: 12/7/17  Lab work 08/03/17    Please review request for cymbalta refill and advise, thank you      No

## 2020-11-14 NOTE — ED PROVIDER NOTE - CARE PROVIDER_API CALL
Parker Islas  Orthopaedic Surgery  [address] [place] [address]  Phone: [phone]  Fax: [phone]  Follow Up Time:

## 2020-12-09 ENCOUNTER — RESULT REVIEW (OUTPATIENT)
Age: 25
End: 2020-12-09

## 2022-06-30 ENCOUNTER — OUTPATIENT (OUTPATIENT)
Dept: OUTPATIENT SERVICES | Facility: HOSPITAL | Age: 27
LOS: 1 days | Discharge: HOME | End: 2022-06-30

## 2022-06-30 ENCOUNTER — APPOINTMENT (OUTPATIENT)
Dept: OBGYN | Facility: CLINIC | Age: 27
End: 2022-06-30

## 2022-06-30 VITALS
SYSTOLIC BLOOD PRESSURE: 118 MMHG | HEIGHT: 66 IN | DIASTOLIC BLOOD PRESSURE: 72 MMHG | WEIGHT: 232 LBS | HEART RATE: 77 BPM | BODY MASS INDEX: 37.28 KG/M2

## 2022-06-30 DIAGNOSIS — Z00.00 ENCOUNTER FOR GENERAL ADULT MEDICAL EXAMINATION WITHOUT ABNORMAL FINDINGS: ICD-10-CM

## 2022-06-30 DIAGNOSIS — Z30.09 ENCOUNTER FOR OTHER GENERAL COUNSELING AND ADVICE ON CONTRACEPTION: ICD-10-CM

## 2022-06-30 DIAGNOSIS — E55.9 VITAMIN D DEFICIENCY, UNSPECIFIED: ICD-10-CM

## 2022-06-30 DIAGNOSIS — G93.2 BENIGN INTRACRANIAL HYPERTENSION: ICD-10-CM

## 2022-06-30 DIAGNOSIS — Z70.8 OTHER SEX COUNSELING: ICD-10-CM

## 2022-06-30 DIAGNOSIS — Z87.42 PERSONAL HISTORY OF OTHER DISEASES OF THE FEMALE GENITAL TRACT: ICD-10-CM

## 2022-06-30 DIAGNOSIS — E66.9 OBESITY, UNSPECIFIED: ICD-10-CM

## 2022-06-30 DIAGNOSIS — F43.21 ADJUSTMENT DISORDER WITH DEPRESSED MOOD: ICD-10-CM

## 2022-06-30 PROCEDURE — 99385 PREV VISIT NEW AGE 18-39: CPT

## 2022-07-06 LAB
A VAGINAE DNA VAG QL NAA+PROBE: NORMAL
BVAB2 DNA VAG QL NAA+PROBE: NORMAL
C KRUSEI DNA VAG QL NAA+PROBE: NEGATIVE
C TRACH RRNA SPEC QL NAA+PROBE: NEGATIVE
HPV HIGH+LOW RISK DNA PNL CVX: NOT DETECTED
MEGA1 DNA VAG QL NAA+PROBE: NORMAL
N GONORRHOEA RRNA SPEC QL NAA+PROBE: NEGATIVE
T VAGINALIS RRNA SPEC QL NAA+PROBE: NEGATIVE

## 2022-07-07 ENCOUNTER — LABORATORY RESULT (OUTPATIENT)
Age: 27
End: 2022-07-07

## 2022-07-07 LAB
ABO + RH PNL BLD: NORMAL
CYTOLOGY CVX/VAG DOC THIN PREP: ABNORMAL
ESTIMATED AVERAGE GLUCOSE: 94 MG/DL
HBA1C MFR BLD HPLC: 4.9 %
TSH SERPL-ACNC: 1.97 UIU/ML

## 2022-07-08 LAB
24R-OH-CALCIDIOL SERPL-MCNC: 80.2 PG/ML
HBV SURFACE AG SER QL: NONREACTIVE

## 2022-07-13 LAB — T PALLIDUM AB SER QL IA: NEGATIVE

## 2022-07-14 ENCOUNTER — NON-APPOINTMENT (OUTPATIENT)
Age: 27
End: 2022-07-14

## 2022-08-21 NOTE — PLAN
[FreeTextEntry1] : A/P: 26yo presenting today for routine Gyn care. \par \par Pap today \par \par STI screening \par \par preconception health screening: BMP, Vit D, T&S, HgA1C, TSH \par

## 2022-08-21 NOTE — HISTORY OF PRESENT ILLNESS
[FreeTextEntry1] : 28yo  presenting today for annual exam. \par \par LMP 6/7/22, history of Cysts previously managed by OCPs \par Regular menses, heavy and crampy periods. Sexually active with men only \par Remote h/o CT+, s/p treatment \par h/o abnormal paps previously getting Pap q6 mo, s/p Colpo \par \par PMH: Pseudotumor on Diamox 250mg, Reflux s/p endoscopy \par \par PSH: Endoscopy \par \par Meds: Diamox \par \par Allergies: NKDA\par \par SocHx: Works as EEG tech, lives alone, feels safe, denies tobacco, tobacco or drug use \par \par FHx: Mother with cHTN, CAD, Father with DM, Grandmother with breast Ca \par \par

## 2022-08-21 NOTE — PHYSICAL EXAM
[Chaperone Present] : A chaperone was present in the examining room during all aspects of the physical examination [Appropriately responsive] : appropriately responsive [Alert] : alert [No Acute Distress] : no acute distress [Soft] : soft [Non-tender] : non-tender [No Lesions] : no lesions [Oriented x3] : oriented x3 [Normal] : normal [FreeTextEntry1] : Noemy LIZ RN

## 2022-09-22 ENCOUNTER — APPOINTMENT (OUTPATIENT)
Dept: OBGYN | Facility: CLINIC | Age: 27
End: 2022-09-22

## 2022-09-22 ENCOUNTER — OUTPATIENT (OUTPATIENT)
Dept: OUTPATIENT SERVICES | Facility: HOSPITAL | Age: 27
LOS: 1 days | Discharge: HOME | End: 2022-09-22

## 2022-09-22 ENCOUNTER — RESULT CHARGE (OUTPATIENT)
Age: 27
End: 2022-09-22

## 2022-09-22 VITALS
BODY MASS INDEX: 37.61 KG/M2 | DIASTOLIC BLOOD PRESSURE: 58 MMHG | SYSTOLIC BLOOD PRESSURE: 116 MMHG | WEIGHT: 233 LBS | HEART RATE: 65 BPM

## 2022-09-22 LAB
HCG UR QL: NEGATIVE
QUALITY CONTROL: YES

## 2022-09-22 PROCEDURE — 99213 OFFICE O/P EST LOW 20 MIN: CPT

## 2022-09-22 RX ORDER — ULIPRISTAL ACETATE 30 MG/1
30 TABLET ORAL
Qty: 1 | Refills: 2 | Status: ACTIVE | COMMUNITY
Start: 2022-09-22 | End: 1900-01-01

## 2022-09-22 NOTE — PLAN
[FreeTextEntry1] : 26yo presenting today with odor, discharge, now improving. \par \par UPT negative today \par \par Patient report complaints improving since starting Boric Acid. \par \par Desires STI screening today \par \par Vaginitis panel sent today \par \par Reproductive life goals counseling: patient declines contraceptive options counseling.  Emergency contraception counseling education provided, and patient accepting Sun prescription.

## 2022-09-22 NOTE — HISTORY OF PRESENT ILLNESS
[FreeTextEntry1] : Patient presenting today endorsing irregular odor, white milky discharge. She reports her discharge has improved since starting Boric acid. \par \par She reports new sexual partner with odor occurring after. She reports taking plan B recently with irregular bleeding after. Denies abdominal pain, bleeding, breast tenderness, or other complaints.

## 2022-09-22 NOTE — PHYSICAL EXAM
[Appropriately responsive] : appropriately responsive [Alert] : alert [No Acute Distress] : no acute distress [Soft] : soft [Non-tender] : non-tender [No Lesions] : no lesions [Oriented x3] : oriented x3 [FreeTextEntry1] : Declined exam today

## 2022-09-23 ENCOUNTER — NON-APPOINTMENT (OUTPATIENT)
Age: 27
End: 2022-09-23

## 2022-09-24 RX ORDER — METRONIDAZOLE 500 MG/1
500 TABLET ORAL TWICE DAILY
Qty: 14 | Refills: 0 | Status: ACTIVE | COMMUNITY
Start: 2022-09-24 | End: 1900-01-01

## 2022-09-28 LAB
A VAGINAE DNA VAG QL NAA+PROBE: NORMAL
BVAB2 DNA VAG QL NAA+PROBE: ABNORMAL
C KRUSEI DNA VAG QL NAA+PROBE: NEGATIVE
C TRACH RRNA SPEC QL NAA+PROBE: NEGATIVE
MEGA1 DNA VAG QL NAA+PROBE: ABNORMAL
N GONORRHOEA RRNA SPEC QL NAA+PROBE: NEGATIVE
T VAGINALIS RRNA SPEC QL NAA+PROBE: NEGATIVE

## 2022-10-05 NOTE — ED ADULT NURSE NOTE - FINAL NURSING ELECTRONIC SIGNATURE
SUBJECTIVE:  Fouzia Weiner is an 51 year old  Presents for:  Chief Complaint   Patient presents with   • Office Visit     On bleeding post d&c. Pelvic Pain   • Dysuria     Pt presents for Evaluation of frequent urination.       History of Chief Complaint: Fouzia is here for LLQ pelvic pain.  She has been worked up for it with a pelvic US done 3/9/22 showed:  FINDINGS: Ultrasound real-time imaging performed by the technologist.  Representative images submitted for interpretation.     Ultrasound interrogation of the pelvis via transabdominal and endovaginal  approach.      The uterus is anteverted in orientation.  The uterus measures normal in  size.  There is a subtle hypoechoic lesion posterior uterine body measuring  2.6 x 1.6 x 2.5 cm suggestive for a uterine fibroid.  This measures larger  in size when compared to prior exam of 1.9 x 1.7 x 1.8 cm.  Small nabothian  cysts.     Endometrium: 6.9 mm this is upper limits normal in size for a  postmenopausal female on hormone therapy.  If no hormone therapy this is  enlarged.  Measures 5.6 mm on the prior exam.  Correlate clinically.  In a  patient with postmenopausal bleeding further management decisions should be  clinically directed.      The adnexa are imaged.  The right ovary is identified and appears  unremarkable.  Right ovarian vascular flow is documented with color and  spectral Doppler. The left ovary is not identified.  There is prominent  vascular structures within the left pelvis.     No free fluid.     IMPRESSION:  1.    Size increased myometrial lesion suggestive of a fibroid now measures  up to 2.6 cm  2.    Endometrium measures 6.9 mm.  For a postmenopausal female normal  thickness is on hormone therapy (enlarged if no hormone therapy).  In a  patient with postmenopausal bleeding further management decisions should be  clinically directed.  3.    Right ovary appears unremarkable.  4.    Left ovary is not identified.  Prominent vessels  are again seen  within the left pelvis and could represent pelvic congestion.     Uterus: 5.8 x 4.3 x 4.9 cm     Cervix: 2.5 cm     Endometrium: 6.9 mm     Right ovary: 1.4 x 1.2 x 1 cm     Left ovary: Not visualized     Electronically Signed by: ELLI LR D.O.   Signed on: 3/9/2022 3:28 PM     She had a hysteroscopy for bleeding problems after the US.  Pathology showed:  Pathologic Diagnosis      Endometrium, curettings:  - Fragments of surface endometrium with features suggestive of weakly proliferative endometrium.   - Fragments of fibromuscular tissue consistent with leiomyoma.   Electronically signed by Carlos Perdomo MD on 5/13/2022 at 1904     Her bleeding has not been an issue since.  She still has the LLQ pain.  She has dysuria and loose of urine with activity.    Past Medical History:   Diagnosis Date   • AIDS (acquired immune deficiency syndrome) (CMS/HCC)    • Alopecia    • Anxiety    • Arthritis    • Asthma    • Atypical squamous cells cannot exclude high grade squamous intraepithelial lesion on cytologic smear of cervix (ASC-H)    • Benign paroxysmal positional vertigo    • Borderline diabetes    • Chlamydia    • Chronic hepatitis C (CMS/HCC)    • Chronic pain     joints and back   • Depression    • Elevated antibody levels     RNP, RF IgM   • Gastroesophageal reflux disease    • Herpes zoster    • Insomnia    • Mixed connective tissue disease (CMS/HCC)    • Peripheral neuropathy    • Personal history of COVID-19 01/2022    RUNNY NOSE, SORETHROAT, HEADACHE, CONGESTION; NO HOSPITALIZATION STAY   • Post-menopausal bleeding 05/2022   • Sicca syndrome (CMS/HCC)    • SLE (systemic lupus erythematosus) (CMS/HCC)    • Stress incontinence, female        Past Surgical History:   Procedure Laterality Date   • Breast biopsy Right     3/4/15, benign breast incisional biopsy   • Colonoscopy     • D and c     • Epidural steroid injection   05/05/2022    C5-6 epidural steroid injection    • Tubal ligation   1997       Current Outpatient Medications   Medication Sig Dispense Refill   • nitrofurantoin, macrocrystal-monohydrate, (MACROBID) 100 MG capsule Take 1 capsule by mouth in the morning and 1 capsule in the evening. 14 capsule 0   • ibuprofen (MOTRIN) 600 MG tablet Take 1 tablet by mouth every 6 hours as needed for Pain. 60 tablet 0   • Cyanocobalamin (B-12) 500 MCG Tab Take 500 mcg by mouth daily.      • VITAMIN D, ERGOCALCIFEROL, PO Take 1 capsule by mouth daily.      • meclizine (ANTIVERT) 25 MG tablet Take 1 tablet by mouth 3 times daily as needed for Dizziness. 15 tablet 0   • fluocinonide (LIDEX) 0.05 % ointment TO LIPS     • nystatin (MYCOSTATIN) 789637 UNIT/ML suspension as needed.      • tacrolimus (PROTOPIC) 0.1 % ointment TO LIPS     • valACYclovir (VALTREX) 500 MG tablet Take 500 mg by mouth 2 times daily.      • Multiple Vitamins-Minerals (MULTIVITAMIN ADULT PO) Take 1 tablet by mouth daily.      • pancrelipase, lipase-protease-amylase, 13509 units per capsule Take 3 capsules by mouth as directed. 3 CAPSULES EACH MEAL AND 1 CAPSULE EACH SNACK     • darunavir-cobicistat (PREZCOBIX) 800-150 MG per tablet Take 1 tablet by mouth daily.     • raltegravir (ISENTRESS) 400 MG tablet Take 400 mg by mouth every 12 hours.        No current facility-administered medications for this visit.     ALLERGIES:   Allergen Reactions   • Bactrim Ds RASH   • Contrast Media Other (See Comments)     SWELLING OF ARM   • Dapsone RASH and HIVES   • Levaquin Other (See Comments)     Paralyze; \"HAD A RASH AND COULDN'T MOVE MY LEGS\"   • Penicillin G RASH   • Penicillins RASH   • Sulfa Antibiotics RASH       Social History     Tobacco Use   • Smoking status: Never Smoker   • Smokeless tobacco: Never Used   Substance Use Topics   • Alcohol use: Not Currently       Review Of Systems  Review of Systems   Constitutional: Negative.    Gastrointestinal:        As above.   Genitourinary: Positive for dysuria.        As above        OBJECTIVE:  Visit Vitals  /82 (BP Location: RUE - Right upper extremity, Patient Position: Sitting, Cuff Size: Regular)   Pulse 77   Temp 97.2 °F (36.2 °C)   Resp 20   Ht 5' 2\" (1.575 m)   Wt 74.8 kg (165 lb)   SpO2 97%   BMI 30.18 kg/m²     Physical Exam  Constitutional:       Appearance: Normal appearance.   Genitourinary:      No lesions in the vagina.      Right Labia: No rash, tenderness, lesions, skin changes or Bartholin's cyst.     Left Labia: No tenderness, lesions, skin changes, Bartholin's cyst or rash.     No labial fusion noted.      No inguinal adenopathy present in the right or left side.     No vaginal discharge, erythema, tenderness, bleeding or ulceration.        Right Adnexa: not tender and no mass present.     Left Adnexa: tender.     Left Adnexa: no mass present.     No cervical motion tenderness, discharge, friability, lesion, polyp, nabothian cyst or eversion.      Uterus is not enlarged or tender.   Abdominal:      General: Abdomen is flat. Bowel sounds are normal. There is no distension.      Palpations: Abdomen is soft. There is no mass.      Tenderness: There is no abdominal tenderness. There is no guarding or rebound.      Hernia: No hernia is present. There is no hernia in the left inguinal area or right inguinal area.   Lymphadenopathy:      Lower Body: No right inguinal adenopathy. No left inguinal adenopathy.   Neurological:      General: No focal deficit present.      Mental Status: She is alert and oriented to person, place, and time.   Psychiatric:         Mood and Affect: Mood normal.         Behavior: Behavior normal.         Thought Content: Thought content normal.         Judgment: Judgment normal.         ASSESSMENT/PLAN:  Diagnoses and all orders for this visit:  Dysuria  -     POCT URINE DIP AUTO  -     URINE, BACTERIAL CULTURE  -     nitrofurantoin, macrocrystal-monohydrate, (MACROBID) 100 MG capsule; Take 1 capsule by mouth in the morning and 1 capsule in the  evening.  Pelvic pain in female  -     US PELVIS NON-OB TRANSABDOMINAL AND TRANSVAGINAL; Future  -     nitrofurantoin, macrocrystal-monohydrate, (MACROBID) 100 MG capsule; Take 1 capsule by mouth in the morning and 1 capsule in the evening.  Fibroids, intramural  -     US PELVIS NON-OB TRANSABDOMINAL AND TRANSVAGINAL; Future  Stress incontinence, female  -     SERVICE TO URODYNAMICS OR URO GYN  -     nitrofurantoin, macrocrystal-monohydrate, (MACROBID) 100 MG capsule; Take 1 capsule by mouth in the morning and 1 capsule in the evening.    A urine culture was sent.  I started her on nitrofurantoin 100 mg twice a day for 7 days until the urine culture comes back.  She feels she gets frequent UTIs.  Due to this and her stress incontinence I placed a referral to urogynecology.  We discussed fibroids again.  We reviewed fibroids can cause bleeding problems, pain or both.  She is not interested in a hysterectomy.  I ordered a follow up US.  The left ovary was normal on the last Us.  I will have them recheck her ovaries again and evaluate the fibroid for any change.  We will call with the results.  She is due for an annual exam in April.     18-Jun-2018 05:58

## 2022-10-27 ENCOUNTER — EMERGENCY (EMERGENCY)
Facility: HOSPITAL | Age: 27
LOS: 0 days | Discharge: HOME | End: 2022-10-28
Attending: EMERGENCY MEDICINE | Admitting: EMERGENCY MEDICINE

## 2022-10-27 VITALS
DIASTOLIC BLOOD PRESSURE: 79 MMHG | TEMPERATURE: 99 F | WEIGHT: 229.94 LBS | RESPIRATION RATE: 22 BRPM | HEIGHT: 66 IN | OXYGEN SATURATION: 99 % | HEART RATE: 125 BPM | SYSTOLIC BLOOD PRESSURE: 132 MMHG

## 2022-10-27 VITALS
TEMPERATURE: 98 F | HEART RATE: 97 BPM | OXYGEN SATURATION: 99 % | SYSTOLIC BLOOD PRESSURE: 103 MMHG | DIASTOLIC BLOOD PRESSURE: 65 MMHG | RESPIRATION RATE: 20 BRPM

## 2022-10-27 DIAGNOSIS — M79.10 MYALGIA, UNSPECIFIED SITE: ICD-10-CM

## 2022-10-27 DIAGNOSIS — Z20.822 CONTACT WITH AND (SUSPECTED) EXPOSURE TO COVID-19: ICD-10-CM

## 2022-10-27 DIAGNOSIS — R50.9 FEVER, UNSPECIFIED: ICD-10-CM

## 2022-10-27 DIAGNOSIS — R11.0 NAUSEA: ICD-10-CM

## 2022-10-27 DIAGNOSIS — R10.30 LOWER ABDOMINAL PAIN, UNSPECIFIED: ICD-10-CM

## 2022-10-27 LAB
ALBUMIN SERPL ELPH-MCNC: 4.7 G/DL — SIGNIFICANT CHANGE UP (ref 3.5–5.2)
ALP SERPL-CCNC: 75 U/L — SIGNIFICANT CHANGE UP (ref 30–115)
ALT FLD-CCNC: 15 U/L — SIGNIFICANT CHANGE UP (ref 0–41)
ANION GAP SERPL CALC-SCNC: 11 MMOL/L — SIGNIFICANT CHANGE UP (ref 7–14)
APPEARANCE UR: CLEAR — SIGNIFICANT CHANGE UP
AST SERPL-CCNC: 13 U/L — SIGNIFICANT CHANGE UP (ref 0–41)
BACTERIA # UR AUTO: NEGATIVE — SIGNIFICANT CHANGE UP
BASOPHILS # BLD AUTO: 0.05 K/UL — SIGNIFICANT CHANGE UP (ref 0–0.2)
BASOPHILS NFR BLD AUTO: 0.3 % — SIGNIFICANT CHANGE UP (ref 0–1)
BILIRUB SERPL-MCNC: 0.3 MG/DL — SIGNIFICANT CHANGE UP (ref 0.2–1.2)
BILIRUB UR-MCNC: NEGATIVE — SIGNIFICANT CHANGE UP
BUN SERPL-MCNC: 10 MG/DL — SIGNIFICANT CHANGE UP (ref 10–20)
CALCIUM SERPL-MCNC: 9.4 MG/DL — SIGNIFICANT CHANGE UP (ref 8.4–10.4)
CHLORIDE SERPL-SCNC: 101 MMOL/L — SIGNIFICANT CHANGE UP (ref 98–110)
CO2 SERPL-SCNC: 25 MMOL/L — SIGNIFICANT CHANGE UP (ref 17–32)
COLOR SPEC: YELLOW — SIGNIFICANT CHANGE UP
CREAT SERPL-MCNC: 0.6 MG/DL — LOW (ref 0.7–1.5)
DIFF PNL FLD: NEGATIVE — SIGNIFICANT CHANGE UP
EGFR: 126 ML/MIN/1.73M2 — SIGNIFICANT CHANGE UP
EOSINOPHIL # BLD AUTO: 0.02 K/UL — SIGNIFICANT CHANGE UP (ref 0–0.7)
EOSINOPHIL NFR BLD AUTO: 0.1 % — SIGNIFICANT CHANGE UP (ref 0–8)
EPI CELLS # UR: 6 /HPF — HIGH (ref 0–5)
FLUAV AG NPH QL: SIGNIFICANT CHANGE UP
FLUBV AG NPH QL: SIGNIFICANT CHANGE UP
GLUCOSE SERPL-MCNC: 81 MG/DL — SIGNIFICANT CHANGE UP (ref 70–99)
GLUCOSE UR QL: NEGATIVE — SIGNIFICANT CHANGE UP
HCG SERPL QL: NEGATIVE — SIGNIFICANT CHANGE UP
HCT VFR BLD CALC: 40 % — SIGNIFICANT CHANGE UP (ref 37–47)
HGB BLD-MCNC: 13.1 G/DL — SIGNIFICANT CHANGE UP (ref 12–16)
HYALINE CASTS # UR AUTO: 2 /LPF — SIGNIFICANT CHANGE UP (ref 0–7)
IMM GRANULOCYTES NFR BLD AUTO: 0.4 % — HIGH (ref 0.1–0.3)
KETONES UR-MCNC: NEGATIVE — SIGNIFICANT CHANGE UP
LACTATE SERPL-SCNC: 1.4 MMOL/L — SIGNIFICANT CHANGE UP (ref 0.7–2)
LEUKOCYTE ESTERASE UR-ACNC: ABNORMAL
LIDOCAIN IGE QN: 38 U/L — SIGNIFICANT CHANGE UP (ref 7–60)
LYMPHOCYTES # BLD AUTO: 14.3 % — LOW (ref 20.5–51.1)
LYMPHOCYTES # BLD AUTO: 2.52 K/UL — SIGNIFICANT CHANGE UP (ref 1.2–3.4)
MCHC RBC-ENTMCNC: 27.5 PG — SIGNIFICANT CHANGE UP (ref 27–31)
MCHC RBC-ENTMCNC: 32.8 G/DL — SIGNIFICANT CHANGE UP (ref 32–37)
MCV RBC AUTO: 84 FL — SIGNIFICANT CHANGE UP (ref 81–99)
MONOCYTES # BLD AUTO: 1.41 K/UL — HIGH (ref 0.1–0.6)
MONOCYTES NFR BLD AUTO: 8 % — SIGNIFICANT CHANGE UP (ref 1.7–9.3)
NEUTROPHILS # BLD AUTO: 13.61 K/UL — HIGH (ref 1.4–6.5)
NEUTROPHILS NFR BLD AUTO: 76.9 % — HIGH (ref 42.2–75.2)
NITRITE UR-MCNC: NEGATIVE — SIGNIFICANT CHANGE UP
NRBC # BLD: 0 /100 WBCS — SIGNIFICANT CHANGE UP (ref 0–0)
PH UR: 6 — SIGNIFICANT CHANGE UP (ref 5–8)
PLATELET # BLD AUTO: 320 K/UL — SIGNIFICANT CHANGE UP (ref 130–400)
POTASSIUM SERPL-MCNC: 3.9 MMOL/L — SIGNIFICANT CHANGE UP (ref 3.5–5)
POTASSIUM SERPL-SCNC: 3.9 MMOL/L — SIGNIFICANT CHANGE UP (ref 3.5–5)
PROT SERPL-MCNC: 7.8 G/DL — SIGNIFICANT CHANGE UP (ref 6–8)
PROT UR-MCNC: NEGATIVE — SIGNIFICANT CHANGE UP
RBC # BLD: 4.76 M/UL — SIGNIFICANT CHANGE UP (ref 4.2–5.4)
RBC # FLD: 12 % — SIGNIFICANT CHANGE UP (ref 11.5–14.5)
RBC CASTS # UR COMP ASSIST: 0 /HPF — SIGNIFICANT CHANGE UP (ref 0–4)
RSV RNA NPH QL NAA+NON-PROBE: SIGNIFICANT CHANGE UP
SARS-COV-2 RNA SPEC QL NAA+PROBE: SIGNIFICANT CHANGE UP
SODIUM SERPL-SCNC: 137 MMOL/L — SIGNIFICANT CHANGE UP (ref 135–146)
SP GR SPEC: 1.02 — SIGNIFICANT CHANGE UP (ref 1.01–1.03)
UROBILINOGEN FLD QL: SIGNIFICANT CHANGE UP
WBC # BLD: 17.68 K/UL — HIGH (ref 4.8–10.8)
WBC # FLD AUTO: 17.68 K/UL — HIGH (ref 4.8–10.8)
WBC UR QL: 21 /HPF — HIGH (ref 0–5)

## 2022-10-27 PROCEDURE — 93010 ELECTROCARDIOGRAM REPORT: CPT

## 2022-10-27 PROCEDURE — 99285 EMERGENCY DEPT VISIT HI MDM: CPT

## 2022-10-27 PROCEDURE — 74177 CT ABD & PELVIS W/CONTRAST: CPT | Mod: 26,MA

## 2022-10-27 RX ORDER — ACETAMINOPHEN 500 MG
975 TABLET ORAL ONCE
Refills: 0 | Status: COMPLETED | OUTPATIENT
Start: 2022-10-27 | End: 2022-10-27

## 2022-10-27 RX ORDER — KETOROLAC TROMETHAMINE 30 MG/ML
15 SYRINGE (ML) INJECTION ONCE
Refills: 0 | Status: DISCONTINUED | OUTPATIENT
Start: 2022-10-27 | End: 2022-10-27

## 2022-10-27 RX ORDER — SODIUM CHLORIDE 9 MG/ML
1000 INJECTION INTRAMUSCULAR; INTRAVENOUS; SUBCUTANEOUS ONCE
Refills: 0 | Status: COMPLETED | OUTPATIENT
Start: 2022-10-27 | End: 2022-10-27

## 2022-10-27 RX ORDER — ONDANSETRON 8 MG/1
4 TABLET, FILM COATED ORAL ONCE
Refills: 0 | Status: COMPLETED | OUTPATIENT
Start: 2022-10-27 | End: 2022-10-27

## 2022-10-27 RX ORDER — MORPHINE SULFATE 50 MG/1
4 CAPSULE, EXTENDED RELEASE ORAL ONCE
Refills: 0 | Status: DISCONTINUED | OUTPATIENT
Start: 2022-10-27 | End: 2022-10-27

## 2022-10-27 RX ORDER — POTASSIUM CHLORIDE 20 MEQ
20 PACKET (EA) ORAL ONCE
Refills: 0 | Status: DISCONTINUED | OUTPATIENT
Start: 2022-10-27 | End: 2022-10-27

## 2022-10-27 RX ADMIN — SODIUM CHLORIDE 1000 MILLILITER(S): 9 INJECTION INTRAMUSCULAR; INTRAVENOUS; SUBCUTANEOUS at 22:05

## 2022-10-27 RX ADMIN — SODIUM CHLORIDE 1000 MILLILITER(S): 9 INJECTION INTRAMUSCULAR; INTRAVENOUS; SUBCUTANEOUS at 19:23

## 2022-10-27 RX ADMIN — Medication 15 MILLIGRAM(S): at 22:00

## 2022-10-27 RX ADMIN — Medication 975 MILLIGRAM(S): at 20:21

## 2022-10-27 NOTE — ED PROVIDER NOTE - OBJECTIVE STATEMENT
26 yo F pmhx asthma presenting to the ED for evaluation of b/l lower abd pain and suprapubic discomfort since yesterday associated with bodyaches and chills. pt reports last night felt nauseous, subjective fever and bodyaches, today abdominal pain developed. denies any dysuria, hematuria, flank pain, chest pain, sob, vaginal discharge

## 2022-10-27 NOTE — ED ADULT NURSE NOTE - CAS ELECT INFOMATION PROVIDED
pt instructed to follow up with pmd in 1-3 days or return to ed for new or worsening symptoms, take medication as pescribed/DC instructions

## 2022-10-27 NOTE — ED PROVIDER NOTE - PHYSICAL EXAMINATION
GENERAL: Well-nourished, Well-developed. NAD.  HEAD: No visible or palpable bumps or hematomas. No ecchymosis behind ears B/L.  Eyes: PERRLA, EOMI. No asymmetry. No nystagmus. No conjunctival injection. Non-icteric sclera.  ENMT: MMM  Neck: Supple. FROM  CVS: Normal S1,S2. No murmurs appreciated on auscultation   RESP: No use of accessory muscles. Chest rise symmetrical with good expansion. Lungs clear to auscultation B/L. No wheezing, rales, or rhonchi auscultated.  GI: Normal auscultation of bowel sounds in all 4 quadrants. (+)lower abd ttp and suprapubic ttp. Soft, Nondistended. No guarding or rebound tenderness. No CVAT B/L.  Skin: Warm, Dry. No rashes or lesions. Good cap refill < 2 sec B/L.  GYN: External Genitalia unremarkable. Cervix pink. No visible blood or discharge in vaginal vault. Chaperoned by: MACY Morton

## 2022-10-27 NOTE — ED PROVIDER NOTE - ATTENDING APP SHARED VISIT CONTRIBUTION OF CARE
27yF p/w b/l LQ abd pain and suprapubic pain, along w/ myalgias, chills and subjective fever.  No v/d or dysuria.  No change in usual vaginal discharge.

## 2022-10-27 NOTE — ED PROVIDER NOTE - NSFOLLOWUPINSTRUCTIONS_ED_ALL_ED_FT
FOLLOW UP WITH YOUR PRIMARY DOCTOR AS DISCUSSED    RETURN TO ED IMMEDIATELY WITH ANY WORSENING SYMPTOMS SUCH AS HIGH FEVER, PERSISTENT VOMITING OR DIARRHEA, DIFFICULTY BREATHING OR ANY OTHER CONCERNS.      Abdominal Pain, Adult      Pain in the abdomen (abdominal pain) can be caused by many things. Often, abdominal pain is not serious and it gets better with no treatment or by being treated at home. However, sometimes abdominal pain is serious.    Your health care provider will ask questions about your medical history and do a physical exam to try to determine the cause of your abdominal pain.      Follow these instructions at home:    Medicines     •Take over-the-counter and prescription medicines only as told by your health care provider.      • Do not take a laxative unless told by your health care provider.        General instructions      •Watch your condition for any changes.      •Drink enough fluid to keep your urine pale yellow.      •Keep all follow-up visits as told by your health care provider. This is important.        Contact a health care provider if:    •Your abdominal pain changes or gets worse.      •You are not hungry or you lose weight without trying.      •You are constipated or have diarrhea for more than 2–3 days.      •You have pain when you urinate or have a bowel movement.      •Your abdominal pain wakes you up at night.      •Your pain gets worse with meals, after eating, or with certain foods.      •You are vomiting and cannot keep anything down.      •You have a fever.      •You have blood in your urine.        Get help right away if:    •Your pain does not go away as soon as your health care provider told you to expect.      •You cannot stop vomiting.      •Your pain is only in areas of the abdomen, such as the right side or the left lower portion of the abdomen. Pain on the right side could be caused by appendicitis.      •You have bloody or black stools, or stools that look like tar.      •You have severe pain, cramping, or bloating in your abdomen.    •You have signs of dehydration, such as:  •Dark urine, very little urine, or no urine.      •Cracked lips.      •Dry mouth.      •Sunken eyes.      •Sleepiness.      •Weakness.        •You have trouble breathing or chest pain.        Summary    •Often, abdominal pain is not serious and it gets better with no treatment or by being treated at home. However, sometimes abdominal pain is serious.      •Watch your condition for any changes.      •Take over-the-counter and prescription medicines only as told by your health care provider.      •Contact a health care provider if your abdominal pain changes or gets worse.      •Get help right away if you have severe pain, cramping, or bloating in your abdomen.      This information is not intended to replace advice given to you by your health care provider. Make sure you discuss any questions you have with your health care provider.

## 2022-10-27 NOTE — ED PROVIDER NOTE - NS ED ROS FT
Constitutional: (+) fever (+) malaise (-) diaphoresis (+) chills (-) wt. loss (+) body aches (-) night sweats  Eyes: (-) visual changes (-) eye pain (-) eye discharge (-) photophobia (-) FB sensation  ENMT: (-) nasal or chest congestion (-) runny nose (-) sore throat (-) hoarseness  (-) hearing changes (-) ear pain (-) ear discharge or infections (-) neck pain (-) neck stiffness  Cardiac: (-) chest pain  (-) palpitations (-) syncope (-) edema  Respiratory: (-) cough (-) SOB (-) KAYE  GI: (+) nausea (-) vomiting (-) diarrhea (+) abdominal pain  : (-) dysuria (-) increased frequency  (-) hematuria (-) incontinence  MS: (-) back pain (-) myalgia (-) muscle weakness (-)  joint pain  Neuro: (-) headache (-) dizziness (-) numbness/tingling to extremities B/L (-) weakness  Skin: (-) rash (-) laceration  GYN: (-) pelvic pain (-) abnormal bleeding (-) abnormal discharge or odor   Except as documented in the HPI, all other systems are negative.

## 2022-10-27 NOTE — ED ADULT NURSE REASSESSMENT NOTE - NS ED NURSE REASSESS COMMENT FT1
pt is aaox3, nad, respirations easy and regular, skin is warm, dry, and normal In color, pt is resting and comfortable

## 2022-10-27 NOTE — ED PROVIDER NOTE - PATIENT PORTAL LINK FT
You can access the FollowMyHealth Patient Portal offered by [facility] by registering at the following website: [email or web address]. By joining [employer]’s FollowMyHealth portal, you will also be able to view your health information using other applications (apps) compatible with our system.

## 2022-10-28 PROCEDURE — 76830 TRANSVAGINAL US NON-OB: CPT | Mod: 26

## 2022-10-28 RX ORDER — CEFTRIAXONE 500 MG/1
1000 INJECTION, POWDER, FOR SOLUTION INTRAMUSCULAR; INTRAVENOUS ONCE
Refills: 0 | Status: COMPLETED | OUTPATIENT
Start: 2022-10-28 | End: 2022-10-28

## 2022-10-28 RX ORDER — CEFPODOXIME PROXETIL 100 MG
1 TABLET ORAL
Qty: 14 | Refills: 0
Start: 2022-10-28 | End: 2022-11-03

## 2022-10-28 RX ADMIN — CEFTRIAXONE 100 MILLIGRAM(S): 500 INJECTION, POWDER, FOR SOLUTION INTRAMUSCULAR; INTRAVENOUS at 00:33

## 2022-10-29 LAB
CULTURE RESULTS: SIGNIFICANT CHANGE UP
SPECIMEN SOURCE: SIGNIFICANT CHANGE UP

## 2023-05-19 ENCOUNTER — EMERGENCY (EMERGENCY)
Facility: HOSPITAL | Age: 28
LOS: 0 days | Discharge: HOME | End: 2023-05-19
Attending: STUDENT IN AN ORGANIZED HEALTH CARE EDUCATION/TRAINING PROGRAM
Payer: COMMERCIAL

## 2023-05-19 VITALS
HEART RATE: 68 BPM | OXYGEN SATURATION: 99 % | DIASTOLIC BLOOD PRESSURE: 72 MMHG | SYSTOLIC BLOOD PRESSURE: 127 MMHG | RESPIRATION RATE: 18 BRPM | TEMPERATURE: 98 F | HEIGHT: 66 IN | WEIGHT: 229.94 LBS

## 2023-05-19 DIAGNOSIS — N39.0 URINARY TRACT INFECTION, SITE NOT SPECIFIED: ICD-10-CM

## 2023-05-19 DIAGNOSIS — R10.31 RIGHT LOWER QUADRANT PAIN: ICD-10-CM

## 2023-05-19 DIAGNOSIS — Z86.69 PERSONAL HISTORY OF OTHER DISEASES OF THE NERVOUS SYSTEM AND SENSE ORGANS: ICD-10-CM

## 2023-05-19 DIAGNOSIS — N83.201 UNSPECIFIED OVARIAN CYST, RIGHT SIDE: ICD-10-CM

## 2023-05-19 LAB
ALBUMIN SERPL ELPH-MCNC: 4.5 G/DL — SIGNIFICANT CHANGE UP (ref 3.5–5.2)
ALP SERPL-CCNC: 63 U/L — SIGNIFICANT CHANGE UP (ref 30–115)
ALT FLD-CCNC: 13 U/L — SIGNIFICANT CHANGE UP (ref 0–41)
ANION GAP SERPL CALC-SCNC: 10 MMOL/L — SIGNIFICANT CHANGE UP (ref 7–14)
APPEARANCE UR: ABNORMAL
AST SERPL-CCNC: 18 U/L — SIGNIFICANT CHANGE UP (ref 0–41)
BACTERIA # UR AUTO: ABNORMAL
BASOPHILS # BLD AUTO: 0.04 K/UL — SIGNIFICANT CHANGE UP (ref 0–0.2)
BASOPHILS NFR BLD AUTO: 0.4 % — SIGNIFICANT CHANGE UP (ref 0–1)
BILIRUB SERPL-MCNC: 0.3 MG/DL — SIGNIFICANT CHANGE UP (ref 0.2–1.2)
BILIRUB UR-MCNC: NEGATIVE — SIGNIFICANT CHANGE UP
BUN SERPL-MCNC: 11 MG/DL — SIGNIFICANT CHANGE UP (ref 10–20)
CALCIUM SERPL-MCNC: 9.3 MG/DL — SIGNIFICANT CHANGE UP (ref 8.4–10.5)
CHLORIDE SERPL-SCNC: 103 MMOL/L — SIGNIFICANT CHANGE UP (ref 98–110)
CO2 SERPL-SCNC: 22 MMOL/L — SIGNIFICANT CHANGE UP (ref 17–32)
COLOR SPEC: YELLOW — SIGNIFICANT CHANGE UP
CREAT SERPL-MCNC: 0.6 MG/DL — LOW (ref 0.7–1.5)
DIFF PNL FLD: NEGATIVE — SIGNIFICANT CHANGE UP
EGFR: 125 ML/MIN/1.73M2 — SIGNIFICANT CHANGE UP
EOSINOPHIL # BLD AUTO: 0.08 K/UL — SIGNIFICANT CHANGE UP (ref 0–0.7)
EOSINOPHIL NFR BLD AUTO: 0.8 % — SIGNIFICANT CHANGE UP (ref 0–8)
EPI CELLS # UR: 7 /HPF — HIGH (ref 0–5)
GLUCOSE SERPL-MCNC: 105 MG/DL — HIGH (ref 70–99)
GLUCOSE UR QL: NEGATIVE — SIGNIFICANT CHANGE UP
HCT VFR BLD CALC: 39.1 % — SIGNIFICANT CHANGE UP (ref 37–47)
HGB BLD-MCNC: 12.8 G/DL — SIGNIFICANT CHANGE UP (ref 12–16)
HYALINE CASTS # UR AUTO: 4 /LPF — SIGNIFICANT CHANGE UP (ref 0–7)
IMM GRANULOCYTES NFR BLD AUTO: 0.2 % — SIGNIFICANT CHANGE UP (ref 0.1–0.3)
KETONES UR-MCNC: NEGATIVE — SIGNIFICANT CHANGE UP
LEUKOCYTE ESTERASE UR-ACNC: ABNORMAL
LYMPHOCYTES # BLD AUTO: 3.69 K/UL — HIGH (ref 1.2–3.4)
LYMPHOCYTES # BLD AUTO: 34.9 % — SIGNIFICANT CHANGE UP (ref 20.5–51.1)
MCHC RBC-ENTMCNC: 27.5 PG — SIGNIFICANT CHANGE UP (ref 27–31)
MCHC RBC-ENTMCNC: 32.7 G/DL — SIGNIFICANT CHANGE UP (ref 32–37)
MCV RBC AUTO: 84.1 FL — SIGNIFICANT CHANGE UP (ref 81–99)
MONOCYTES # BLD AUTO: 0.71 K/UL — HIGH (ref 0.1–0.6)
MONOCYTES NFR BLD AUTO: 6.7 % — SIGNIFICANT CHANGE UP (ref 1.7–9.3)
NEUTROPHILS # BLD AUTO: 6.02 K/UL — SIGNIFICANT CHANGE UP (ref 1.4–6.5)
NEUTROPHILS NFR BLD AUTO: 57 % — SIGNIFICANT CHANGE UP (ref 42.2–75.2)
NITRITE UR-MCNC: NEGATIVE — SIGNIFICANT CHANGE UP
NRBC # BLD: 0 /100 WBCS — SIGNIFICANT CHANGE UP (ref 0–0)
PH UR: 7 — SIGNIFICANT CHANGE UP (ref 5–8)
PLATELET # BLD AUTO: 322 K/UL — SIGNIFICANT CHANGE UP (ref 130–400)
PMV BLD: 9.5 FL — SIGNIFICANT CHANGE UP (ref 7.4–10.4)
POTASSIUM SERPL-MCNC: 4.6 MMOL/L — SIGNIFICANT CHANGE UP (ref 3.5–5)
POTASSIUM SERPL-SCNC: 4.6 MMOL/L — SIGNIFICANT CHANGE UP (ref 3.5–5)
PROT SERPL-MCNC: 7.3 G/DL — SIGNIFICANT CHANGE UP (ref 6–8)
PROT UR-MCNC: SIGNIFICANT CHANGE UP
RBC # BLD: 4.65 M/UL — SIGNIFICANT CHANGE UP (ref 4.2–5.4)
RBC # FLD: 12.4 % — SIGNIFICANT CHANGE UP (ref 11.5–14.5)
RBC CASTS # UR COMP ASSIST: 3 /HPF — SIGNIFICANT CHANGE UP (ref 0–4)
SODIUM SERPL-SCNC: 135 MMOL/L — SIGNIFICANT CHANGE UP (ref 135–146)
SP GR SPEC: 1.02 — SIGNIFICANT CHANGE UP (ref 1.01–1.03)
UROBILINOGEN FLD QL: SIGNIFICANT CHANGE UP
WBC # BLD: 10.56 K/UL — SIGNIFICANT CHANGE UP (ref 4.8–10.8)
WBC # FLD AUTO: 10.56 K/UL — SIGNIFICANT CHANGE UP (ref 4.8–10.8)
WBC UR QL: 26 /HPF — HIGH (ref 0–5)

## 2023-05-19 PROCEDURE — 96374 THER/PROPH/DIAG INJ IV PUSH: CPT

## 2023-05-19 PROCEDURE — 96375 TX/PRO/DX INJ NEW DRUG ADDON: CPT

## 2023-05-19 PROCEDURE — 99284 EMERGENCY DEPT VISIT MOD MDM: CPT | Mod: 25

## 2023-05-19 PROCEDURE — 87086 URINE CULTURE/COLONY COUNT: CPT

## 2023-05-19 PROCEDURE — 81001 URINALYSIS AUTO W/SCOPE: CPT

## 2023-05-19 PROCEDURE — 76830 TRANSVAGINAL US NON-OB: CPT | Mod: 26

## 2023-05-19 PROCEDURE — 36415 COLL VENOUS BLD VENIPUNCTURE: CPT

## 2023-05-19 PROCEDURE — 80053 COMPREHEN METABOLIC PANEL: CPT

## 2023-05-19 PROCEDURE — 99285 EMERGENCY DEPT VISIT HI MDM: CPT

## 2023-05-19 PROCEDURE — 76830 TRANSVAGINAL US NON-OB: CPT

## 2023-05-19 PROCEDURE — 85025 COMPLETE CBC W/AUTO DIFF WBC: CPT

## 2023-05-19 RX ORDER — KETOROLAC TROMETHAMINE 30 MG/ML
15 SYRINGE (ML) INJECTION ONCE
Refills: 0 | Status: DISCONTINUED | OUTPATIENT
Start: 2023-05-19 | End: 2023-05-19

## 2023-05-19 RX ORDER — CEFPODOXIME PROXETIL 100 MG
1 TABLET ORAL
Qty: 28 | Refills: 0
Start: 2023-05-19 | End: 2023-06-01

## 2023-05-19 RX ORDER — ACETAMINOPHEN 500 MG
650 TABLET ORAL ONCE
Refills: 0 | Status: COMPLETED | OUTPATIENT
Start: 2023-05-19 | End: 2023-05-19

## 2023-05-19 RX ORDER — CEFTRIAXONE 500 MG/1
1000 INJECTION, POWDER, FOR SOLUTION INTRAMUSCULAR; INTRAVENOUS ONCE
Refills: 0 | Status: COMPLETED | OUTPATIENT
Start: 2023-05-19 | End: 2023-05-19

## 2023-05-19 RX ORDER — SODIUM CHLORIDE 9 MG/ML
1000 INJECTION INTRAMUSCULAR; INTRAVENOUS; SUBCUTANEOUS ONCE
Refills: 0 | Status: COMPLETED | OUTPATIENT
Start: 2023-05-19 | End: 2023-05-19

## 2023-05-19 RX ORDER — SODIUM CHLORIDE 9 MG/ML
1000 INJECTION, SOLUTION INTRAVENOUS ONCE
Refills: 0 | Status: COMPLETED | OUTPATIENT
Start: 2023-05-19 | End: 2023-05-19

## 2023-05-19 RX ADMIN — SODIUM CHLORIDE 1000 MILLILITER(S): 9 INJECTION, SOLUTION INTRAVENOUS at 13:58

## 2023-05-19 RX ADMIN — Medication 15 MILLIGRAM(S): at 17:34

## 2023-05-19 RX ADMIN — SODIUM CHLORIDE 1000 MILLILITER(S): 9 INJECTION INTRAMUSCULAR; INTRAVENOUS; SUBCUTANEOUS at 16:53

## 2023-05-19 RX ADMIN — CEFTRIAXONE 100 MILLIGRAM(S): 500 INJECTION, POWDER, FOR SOLUTION INTRAMUSCULAR; INTRAVENOUS at 17:34

## 2023-05-19 RX ADMIN — Medication 650 MILLIGRAM(S): at 17:34

## 2023-05-19 NOTE — ED PROVIDER NOTE - CARE PROVIDER_API CALL
Rohit Mckeon  INTERNAL MEDICINE  [address] [place] [id number]  Phone: [phone]  Fax: [phone]  Follow Up Time: 1-3 Days    Russ Thompson)  Obstetrics and Gynecology  [facility] [place] [address]  Phone: [phone]  Fax: [phone]  Follow Up Time: 1-3 Days

## 2023-05-19 NOTE — CONSULT NOTE ADULT - SUBJECTIVE AND OBJECTIVE BOX
OBGYN PGY1    Chief Complaint: pelvic pain    HPI: 27yo , LMP 23, PMHx pseudotumor cerebri, presents to the emergency department with pain for the past 4 days. She reports that during intercourse she suddenly started having pain, was initially 3/10 and is now 6/10. Describes it as dull, worse in the RLQ than the LLQ, and reports that it feels like her previous ruptured cysts, only worse. She took Advil and used a heating pad at home. Both improved her pain. Denies nausea, vomiting, headache, dizziness, chest pain, SOB, palpitations.    Ob/Gyn History:                   LMP - 23                  Cycle Length - regular  h/o SAB, no D+C    GynHx: h/o HPV pos on PAPs, most recent normal per patient. h/o ovarian cysts, previously treated with OCPs. Denies history of uterine fibroids and STIs    Denies the following: constitutional symptoms, visual symptoms, cardiovascular symptoms, respiratory symptoms, GI symptoms, musculoskeletal symptoms, skin symptoms, neurologic symptoms, hematologic symptoms, allergic symptoms, psychiatric symptoms  Except any pertinent positives listed.     Home Medications:  acetaZOLAMIDE 250 mg oral tablet: 1 tab(s) orally every 12 hours until 7/15/2018 the start taking 2 tabs orally every 12 hours (2018 12:32)  Diamox 250 mg oral tablet: 1 tab(s) orally once a day (2020 01:20)  Dionex:  (2018 12:32)    No Known Allergies    PAST MEDICAL & SURGICAL HISTORY:  Headache  Papilledema  No significant past surgical history    FAMILY HISTORY:  No pertinent family history in first degree relatives    SOCIAL HISTORY: Denies cigarette use, alcohol use, or illicit drug use    Vital Signs Last 24 Hrs  T(F): 98.4 (19 May 2023 12:57), Max: 98.4 (19 May 2023 12:57)  HR: 68 (19 May 2023 12:57) (68 - 68)  BP: 127/72 (19 May 2023 12:57) (127/72 - 127/72)  RR: 18 (19 May 2023 12:57) (18 - 18)    General Appearance - AAOx3, NAD  Heart - S1S2 regular rate and rhythm  Lung - CTA Bilaterally  Abdomen - Soft, diffusely tender, nondistended, no rebound, no rigidity, no guarding, bowel sounds present    GYN/Pelvis:  External genitalia: normal  Vagina: no blood, no lacerations, no discharge  Cervix: normal cervix, no cervical motion tenderness  Uterus: no fundal tenderness  Adnexa: diffusely tender bilaterally, no masses or fullness palpated bilaterally    LABS:                        12.8   10.56 )-----------( 322      ( 19 May 2023 14:00 )             39.1         135  |  103  |  11  ----------------------------<  105<H>  4.6   |  22  |  0.6<L>    Ca    9.3      19 May 2023 14:00    TPro  7.3  /  Alb  4.5  /  TBili  0.3  /  DBili  x   /  AST  18  /  ALT  13  /  AlkPhos  63      Urinalysis Basic - ( 19 May 2023 14:00 )  Color: Yellow / Appearance: Turbid / S.023 / pH: x  Gluc: x / Ketone: Negative  / Bili: Negative / Urobili: <2 mg/dL   Blood: x / Protein: Trace / Nitrite: Negative   Leuk Esterase: Large / RBC: 3 /HPF / WBC 26 /HPF   Sq Epi: x / Non Sq Epi: x / Bacteria: Many    RADIOLOGY & ADDITIONAL STUDIES:  < from: US Transvaginal (23 @ 16:11) >    ACC: [id number] EXAM:  US TRANSVAGINAL   ORDERED BY: AB CEJA     PROCEDURE DATE:  2023          INTERPRETATION:  CLINICAL INFORMATION: Right lower quadrant pain    LMP: 2023    COMPARISON: 10/20/2022.    TECHNIQUE:  Endovaginal and transabdominal pelvic sonogram. Color and Spectral   Doppler was performed.    FINDINGS:  Uterus: The anteverted uterus measures 8.9 x 3.9 x 5.8 cm    Endometrium: 0.95 cm. Within normal limits.    Right ovary: Within the right ovary, there is a complicated cyst   measuring up to 4.9 cm. The right ovary measures up to 6.1 cm. Vascular   flow is noted in the ovarian parenchyma.  Left ovary: 3.4 x 2.4 x 2.5 cm. Within the left ovary, there is a complex   probable hemorrhagic cyst measuring up to 1.8 cm. Normal Doppler signal    Fluid: None.      IMPRESSION:      Large right ovarian cyst as above. Torsion/detorsion is difficult to   exclude given ovarian size.        --- End of Report ---            AMARJIT RIVERA MD; Attending Radiologist  This document has been electronically signed. May 19 2023  4:14PM    < end of copied text >

## 2023-05-19 NOTE — CONSULT NOTE ADULT - CONSULT REASON
The pt is phoned and informed it should either be a Profrom or a Dorsiwedge night splint and she is just to remove the extra padding inside the boot before using it.    She is phoned and informed and is thankful for the information.   RLQ pain

## 2023-05-19 NOTE — ED PROVIDER NOTE - OBJECTIVE STATEMENT
28 year old female, , who presents with rlq pain. patient with constant RLQ pain that began x4 days ago radiating to lower back, worsening today prompting ed eval. denies f/c, urinary symptoms, bowel changes, vaginal bleeding/discharge. LMP .

## 2023-05-19 NOTE — ED PROVIDER NOTE - CARE PROVIDERS DIRECT ADDRESSES
[email or web address]

## 2023-05-19 NOTE — ED PROVIDER NOTE - PHYSICAL EXAMINATION
CONSTITUTIONAL: Well-developed; well-nourished; in no acute distress, nontoxic appearing  SKIN: skin exam is warm and dry  ENT: MMM  CARD: S1, S2 normal, no murmur  RESP: No wheezes, rales or rhonchi. Good air movement bilaterally  ABD: soft; non-distended; +RLQ TTP, no rebound/guarding. no CVAT   EXT: Normal ROM.    NEURO: awake, alert, following commands, oriented, grossly unremarkable. No Focal deficits. GCS 15.   PSYCH: Cooperative, appropriate.

## 2023-05-19 NOTE — ED PROVIDER NOTE - PATIENT PORTAL LINK FT
You can access the FollowMyHealth Patient Portal offered by [facility] by registering at the following website: [email or web address]. By joining [employer]’s FollowMyHealth portal, you will also be able to view your health information using other applications (apps) compatible with our system.

## 2023-05-19 NOTE — ED PROVIDER NOTE - NSFOLLOWUPINSTRUCTIONS_ED_ALL_ED_FT
Urinary Tract Infection    A urinary tract infection (UTI) is an infection of any part of the urinary tract, which includes the kidneys, ureters, bladder, and urethra. Risk factors include ignoring your need to urinate, wiping back to front if female, being an uncircumcised male, and having diabetes or a weak immune system. Symptoms include frequent urination, pain or burning with urination, foul smelling urine, cloudy urine, pain in the lower abdomen, blood in the urine, and fever. If you were prescribed an antibiotic medicine, take it as told by your health care provider. Do not stop taking the antibiotic even if you start to feel better.    SEEK IMMEDIATE MEDICAL CARE IF YOU HAVE THE FOLLOWING SYMPTOMS: severe back or abdominal pain, inability to keep fluids or medicine down, dizziness/lightheadedness, or a change in mental status.          Ovarian Cyst    WHAT YOU NEED TO KNOW:    An ovarian cyst is a sac that grows on an ovary. This sac usually contains fluid, but may sometimes have blood or tissue in it. Most ovarian cysts are harmless and go away without treatment in a few months. Some cysts can grow large, cause pain, or break open.     DISCHARGE INSTRUCTIONS:    Call 911 for any of the following:     You are too weak or dizzy to stand up.        Return to the emergency department if:     You have severe abdominal pain. The pain may be sharp and sudden.      You have a fever.    Contact your healthcare provider if:     Your periods are early, late, or more painful than usual.      You have bleeding from your vagina that is not your period.      You have abdominal pain all the time.      Your abdomen is swollen.      You have feelings of fullness, pressure, or discomfort in your abdomen.      You have trouble urinating or emptying your bladder completely.      You have pain during sex.      You are losing weight without trying.      You have questions or concerns about your condition or care.    Medicines: You may need any of the following:     NSAIDs, such as ibuprofen, help decrease swelling, pain, and fever. This medicine is available with or without a doctor's order. NSAIDs can cause stomach bleeding or kidney problems in certain people. If you take blood thinner medicine, always ask if NSAIDs are safe for you. Always read the medicine label and follow directions. Do not give these medicines to children under 6 months of age without direction from your child's healthcare provider.      Birth control pills may help to control your periods, prevent cysts, or cause them to shrink.      Take your medicine as directed. Contact your healthcare provider if you think your medicine is not helping or if you have side effects. Tell him or her if you are allergic to any medicine. Keep a list of the medicines, vitamins, and herbs you take. Include the amounts, and when and why you take them. Bring the list or the pill bottles to follow-up visits. Carry your medicine list with you in case of an emergency.    Follow up with your healthcare provider as directed: Write down your questions so you remember to ask them during your visits.     Apply heat to decrease pain and cramping: Sit in a warm bath, or place a heating pad (turned on low) or a hot water bottle on your abdomen. Do this for 15 to 20 minutes every hour for as many days as directed.

## 2023-05-19 NOTE — ED PROVIDER NOTE - ATTENDING APP SHARED VISIT CONTRIBUTION OF CARE
27 yo f with pmh of ovarian cysts, f/u with dr. rollins, presents with RLQ pain x 3 days.  waves of sharp pain and then dull ache.  no n/v/d.  pt admits also has mild dysuria with radiation to the back.  no fever or chillls. no vaginal bleeding or discharge.  exam: nad, ncat, perrl, eomi, mmm, rrr, ctab, abd soft, mildly ttp RLQ, nd, no cvat, overweight, aox3, imP: pt with RLQ pain, will check labs, ua, us pelvis

## 2023-05-19 NOTE — CONSULT NOTE ADULT - ASSESSMENT
A/P: 27yo , LMP 23, PMHx pseudotumor cerebri, with pelvic pain likely 2/2 ruptured cyst, low suspicion for ovarian torsion, clinically and hemodynamically stable    - no acute gyn intervention at this time  - bleeding precautions given  - tylenol/advil PRN for pain  - dispo per ED    Dr. Newell and Dr. Day aware

## 2023-05-19 NOTE — ED PROVIDER NOTE - PROVIDER TOKENS
PROVIDER:[TOKEN:[[id number]],FOLLOWUP:[1-3 Days]],PROVIDER:[TOKEN:[[id number]],FOLLOWUP:[1-3 Days]]

## 2023-05-21 LAB
CULTURE RESULTS: SIGNIFICANT CHANGE UP
SPECIMEN SOURCE: SIGNIFICANT CHANGE UP

## 2023-06-05 ENCOUNTER — APPOINTMENT (OUTPATIENT)
Dept: OBGYN | Facility: CLINIC | Age: 28
End: 2023-06-05

## 2023-07-11 ENCOUNTER — APPOINTMENT (OUTPATIENT)
Dept: OBGYN | Facility: CLINIC | Age: 28
End: 2023-07-11
Payer: COMMERCIAL

## 2023-07-11 ENCOUNTER — OUTPATIENT (OUTPATIENT)
Dept: OUTPATIENT SERVICES | Facility: HOSPITAL | Age: 28
LOS: 1 days | End: 2023-07-11
Payer: COMMERCIAL

## 2023-07-11 VITALS
SYSTOLIC BLOOD PRESSURE: 138 MMHG | RESPIRATION RATE: 96 BRPM | DIASTOLIC BLOOD PRESSURE: 87 MMHG | BODY MASS INDEX: 40.98 KG/M2 | HEART RATE: 96 BPM | WEIGHT: 255 LBS | HEIGHT: 66 IN

## 2023-07-11 DIAGNOSIS — Z87.442 PERSONAL HISTORY OF URINARY CALCULI: ICD-10-CM

## 2023-07-11 DIAGNOSIS — Z01.419 ENCOUNTER FOR GYNECOLOGICAL EXAMINATION (GENERAL) (ROUTINE) WITHOUT ABNORMAL FINDINGS: ICD-10-CM

## 2023-07-11 PROCEDURE — 88142 CYTOPATH C/V THIN LAYER: CPT

## 2023-07-11 PROCEDURE — 99395 PREV VISIT EST AGE 18-39: CPT

## 2023-07-11 PROCEDURE — 87624 HPV HI-RISK TYP POOLED RSLT: CPT

## 2023-07-11 PROCEDURE — 87591 N.GONORRHOEAE DNA AMP PROB: CPT

## 2023-07-11 PROCEDURE — 87661 TRICHOMONAS VAGINALIS AMPLIF: CPT

## 2023-07-11 PROCEDURE — 87491 CHLMYD TRACH DNA AMP PROBE: CPT

## 2023-07-12 NOTE — PHYSICAL EXAM
[Appropriately responsive] : appropriately responsive [Alert] : alert [No Acute Distress] : no acute distress [Soft] : soft [Non-tender] : non-tender [No Lesions] : no lesions [Oriented x3] : oriented x3 [Labia Majora] : normal [Normal] : normal [Uterine Adnexae] : normal

## 2023-07-13 ENCOUNTER — APPOINTMENT (OUTPATIENT)
Dept: OBGYN | Facility: CLINIC | Age: 28
End: 2023-07-13

## 2023-07-13 ENCOUNTER — NON-APPOINTMENT (OUTPATIENT)
Age: 28
End: 2023-07-13

## 2023-07-13 LAB
C TRACH RRNA SPEC QL NAA+PROBE: NOT DETECTED
HPV HIGH+LOW RISK DNA PNL CVX: NOT DETECTED
N GONORRHOEA RRNA SPEC QL NAA+PROBE: NOT DETECTED
SOURCE AMPLIFICATION: NORMAL
SOURCE AMPLIFICATION: NORMAL
T VAGINALIS RRNA SPEC QL NAA+PROBE: NOT DETECTED

## 2023-07-13 NOTE — HISTORY OF PRESENT ILLNESS
[FreeTextEntry1] : Patient presenting today for annual exam. \par \par She was seen in the ED recently for left sided pain, was diagnosed with an ovarian cyst. She reports pain has improved at treatment for UTI. \par \par She is interested in pregnancy in about 1 years time, but is trying to loose weight to optimize health before then. \par She desires to loose weight and hopefully not require Diamox any further. \par \par GynHx: history of Cysts previously managed by OCPs  Regular menses, heavy and crampy periods. Sexually active with men only, Remote h/o CT+, s/p treatment \par h/o abnormal paps previously getting Pap q6 mo, s/p Colpo - last pap here normal, will repeat given h/o abnormal \par \par PMH: Pseudotumor on Diamox 250mg, Reflux s/p endoscopy \par \par PSH: Endoscopy \par \par Meds: Diamox \par \par Allergies: NKDA\par \par SocHx: Works as EEG tech, lives alone, feels safe, denies tobacco, tobacco or drug use \par \par FHx: Mother with cHTN, CAD, Father with DM, Grandmother with breast Ca \par \par  [TextBox_34] : Denies breast complaints, changes or other issues

## 2023-07-13 NOTE — PLAN
[FreeTextEntry1] : 29yo G0 presenting today for annual exam \par \par Reproductive life goals counseling: desires pregnancy in ~1 year - does not desire to discuss ways to prevent pregnancy until that time\par - Patient would be ok with pregnancy before that time \par - Preconception counseling - patient desires pregnancy in about 1 yr as above, currently on diamox of Pseudotumor - she is accepting of pre conception appointment with MFM for anticipatory education \par - Nutrition counseling referral provided - pt counseled on behavioral modifications like increasing weight baring activities \par \par HPV screening: h/o abnormal pap\par - Repeat today \par \par STI screening offered and accepted \par \par Ovarian cyst \par - torsion precautions provided\par - Repeat US in 3  months of interval assessment \par \par RTC PRN \par \par

## 2023-07-14 ENCOUNTER — OUTPATIENT (OUTPATIENT)
Dept: OUTPATIENT SERVICES | Facility: HOSPITAL | Age: 28
LOS: 1 days | End: 2023-07-14
Payer: COMMERCIAL

## 2023-07-14 ENCOUNTER — LABORATORY RESULT (OUTPATIENT)
Age: 28
End: 2023-07-14

## 2023-07-14 DIAGNOSIS — Z00.00 ENCOUNTER FOR GENERAL ADULT MEDICAL EXAMINATION WITHOUT ABNORMAL FINDINGS: ICD-10-CM

## 2023-07-14 LAB
CYTOLOGY CVX/VAG DOC THIN PREP: NORMAL
ESTIMATED AVERAGE GLUCOSE: 108 MG/DL
HBA1C MFR BLD HPLC: 5.4 %

## 2023-07-14 PROCEDURE — 86689 HTLV/HIV CONFIRMJ ANTIBODY: CPT

## 2023-07-14 PROCEDURE — 86780 TREPONEMA PALLIDUM: CPT

## 2023-07-14 PROCEDURE — 36415 COLL VENOUS BLD VENIPUNCTURE: CPT

## 2023-07-14 PROCEDURE — 83036 HEMOGLOBIN GLYCOSYLATED A1C: CPT

## 2023-07-14 PROCEDURE — 87389 HIV-1 AG W/HIV-1&-2 AB AG IA: CPT

## 2023-07-14 PROCEDURE — 87340 HEPATITIS B SURFACE AG IA: CPT

## 2023-07-15 DIAGNOSIS — Z00.00 ENCOUNTER FOR GENERAL ADULT MEDICAL EXAMINATION WITHOUT ABNORMAL FINDINGS: ICD-10-CM

## 2023-07-15 LAB
HBV SURFACE AG SER QL: NONREACTIVE
T PALLIDUM AB SER QL IA: NEGATIVE

## 2023-07-18 ENCOUNTER — APPOINTMENT (OUTPATIENT)
Dept: OBGYN | Facility: CLINIC | Age: 28
End: 2023-07-18

## 2023-07-19 DIAGNOSIS — Z00.00 ENCOUNTER FOR GENERAL ADULT MEDICAL EXAMINATION WITHOUT ABNORMAL FINDINGS: ICD-10-CM

## 2023-07-19 DIAGNOSIS — E66.9 OBESITY, UNSPECIFIED: ICD-10-CM

## 2023-07-19 DIAGNOSIS — G93.2 BENIGN INTRACRANIAL HYPERTENSION: ICD-10-CM

## 2023-07-24 ENCOUNTER — APPOINTMENT (OUTPATIENT)
Dept: ANTEPARTUM | Facility: CLINIC | Age: 28
End: 2023-07-24
Payer: COMMERCIAL

## 2023-07-24 ENCOUNTER — OUTPATIENT (OUTPATIENT)
Dept: OUTPATIENT SERVICES | Facility: HOSPITAL | Age: 28
LOS: 1 days | End: 2023-07-24
Payer: COMMERCIAL

## 2023-07-24 ENCOUNTER — ASOB RESULT (OUTPATIENT)
Age: 28
End: 2023-07-24

## 2023-07-24 VITALS
BODY MASS INDEX: 41.54 KG/M2 | HEART RATE: 74 BPM | SYSTOLIC BLOOD PRESSURE: 113 MMHG | HEIGHT: 66 IN | DIASTOLIC BLOOD PRESSURE: 69 MMHG | TEMPERATURE: 98.4 F | WEIGHT: 258.44 LBS

## 2023-07-24 DIAGNOSIS — Z34.90 ENCOUNTER FOR SUPERVISION OF NORMAL PREGNANCY, UNSPECIFIED, UNSPECIFIED TRIMESTER: ICD-10-CM

## 2023-07-24 DIAGNOSIS — O09.90 SUPERVISION OF HIGH RISK PREGNANCY, UNSPECIFIED, UNSPECIFIED TRIMESTER: ICD-10-CM

## 2023-07-24 DIAGNOSIS — G93.2 BENIGN INTRACRANIAL HYPERTENSION: ICD-10-CM

## 2023-07-24 DIAGNOSIS — Z00.00 ENCOUNTER FOR GENERAL ADULT MEDICAL EXAMINATION W/OUT ABNORMAL FINDINGS: ICD-10-CM

## 2023-07-24 LAB
BP DIAS: 69 MM HG
BP SYS: 113 MM HG
OB COMMENTS: NORMAL
SCHEDULED VISIT: YES
URINE ALBUMIN/PROTEIN: NORMAL
URINE GLUCOSE: NORMAL
URINE KETONES: NORMAL
WEEKS GESTATION: 0

## 2023-07-24 PROCEDURE — 99204 OFFICE O/P NEW MOD 45 MIN: CPT

## 2023-07-24 PROCEDURE — 76856 US EXAM PELVIC COMPLETE: CPT

## 2023-07-24 PROCEDURE — 76830 TRANSVAGINAL US NON-OB: CPT

## 2023-07-24 PROCEDURE — 76830 TRANSVAGINAL US NON-OB: CPT | Mod: 26

## 2023-07-24 PROCEDURE — 76856 US EXAM PELVIC COMPLETE: CPT | Mod: 26,59

## 2023-07-24 RX ORDER — PRENATAL VIT NO.126/IRON/FOLIC 28MG-0.8MG
28-0.8 TABLET ORAL DAILY
Qty: 100 | Refills: 0 | Status: ACTIVE | COMMUNITY
Start: 2023-07-24 | End: 1900-01-01

## 2023-07-24 NOTE — DISCUSSION/SUMMARY
[FreeTextEntry1] : Patient is a 27 y/o  LMP  (regular monthly periods) presenting for consultation for pseudotumor cerebri (IIH). Currently managed by Dr. Clifton.\par Patient reports she began having headaches in  and was seen by a neurologist, who noted papilledema. She was placed on Diamox when symptoms worsened and she was sent to the hospital for therapeutic LP. MRA and MRV done at the time were normal.  Since then she has continued on Diamox 250 mg BID with no recurrence in symptoms for the past 2 years. Reports she takes the Diamox once a day maybe 3-4 times a week. Last saw Ophtalmology Dr. Cabezas 1 month ago and Dr. Almendarez neurologist she saw several months ago and has an appointment again in October. \par She reports she plans to lose weight and get her health in order before attempting pregnancy. Does not currently use contraception, tries to time her intercourse based on her period tracking kim for her least fertile days.  \par Patient also being evaluated for ovarian cyst today, noted on sono in May after she presented to ED for abdominal pain. Ruled to be likely from ruptured cyst, patient discharged stable with no recurrence of symptoms. No cyst seen today on GYN US. Ovaries appear polycystic. 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 Denies smoking, alcohol or drug use.\par Psych: H/o anxiety at the age of 16, saw a therapist in high school who put her on Klonopin which she didn’t really take, reports normal anxiety now and good coping skills. No recurrence of symptoms\par Genetics: denies h/o genetic abnormalities\par \par Physical Exam: BP: 113/69, HR: 74 bpm, O2sat: 100 %, Wt: 258 lbs \par GA: AOx3, NAD\par Heart: RRR, no M/R/G\par Lungs: CTAB\par Abd: soft, nontender, nondistended\par LE: no erythema, edema or pain\par \par Imaging:\par 2018 normal head MRA and MRV\par \par f/u sonogram report from today (5cm ovarian cyst no longer noted)\par \par A/P Patient is a 29yo  LMP  (regular monthly periods) presenting for consultation for pseudotumor cerebri (IIH). Currently managed by Dr. Clifton.\par \par #Pseudotumor Cerebri\par - Discussed with patient risks of continued Diamox use. One article found teratogenicity in animals at 200–1000mg/kg, but none identified in a study of 50 human pregnancies. Patient taking medication at much lower dose, decision to continue medication would be a shared decision between the neurologist, the Ob, and the patient.  \par - If patient symptomatic during pregnancy, Diamox may be used in second and third trimester after organogenesis to avoid potential for teratogenicity.\par - Advised weight loss and healthy diet.\par - Advised patient to take prenatal vitamins with at least 400 ug folic acid.\par - Patient encouraged to discuss contraception options with PMD in order to avoid unplanned pregnancy before she has a chance to see her neurologist in October to discuss her Diamox use.\par - Records release signed to obtain records from Neurologist.\par \par #Ovarian Cyst\par - Patient asymptomatic and cyst resolved on sonogram today.\par \par -------\par Sources: Korey M, Wakerley BR, Melissa S, et Jesusging idiopathic intracranial hypertension in pregnancy: practical advice Practical Neurology ;[phone].\par

## 2023-07-24 NOTE — END OF VISIT
[] : Resident [FreeTextEntry3] : Patient was counseled on the effect of pregnancy on IIH and vice versa, as well as preconception and pregnancy management with this condition. \par She was advised to seek contraceptive management in case she does not want to have an unplanned pregnancy as her current method is not that effective. \par Advised her to lose weight and supplement with Folic acid and prenatal vitamins starting now in case she gets pregnant. \par Advised her to follow-up with her neurologist to inform them of her intent to get pregnant and to discuss possible waning off Diamox if possible and if not to discuss further management. \par Asked to get records fro m her neurologist.\par Follow-up after visit with Dr. Clifton and Neurology or as needed. \par

## 2023-07-25 ENCOUNTER — APPOINTMENT (OUTPATIENT)
Dept: OBGYN | Facility: CLINIC | Age: 28
End: 2023-07-25

## 2023-07-25 VITALS — WEIGHT: 254 LBS | HEIGHT: 66 IN | BODY MASS INDEX: 40.82 KG/M2

## 2023-07-25 DIAGNOSIS — Z00.00 ENCOUNTER FOR GENERAL ADULT MEDICAL EXAMINATION WITHOUT ABNORMAL FINDINGS: ICD-10-CM

## 2023-07-25 DIAGNOSIS — E66.9 OBESITY, UNSPECIFIED: ICD-10-CM

## 2023-07-25 DIAGNOSIS — G93.2 BENIGN INTRACRANIAL HYPERTENSION: ICD-10-CM

## 2023-07-25 DIAGNOSIS — E28.2 POLYCYSTIC OVARIAN SYNDROME: ICD-10-CM

## 2023-09-13 ENCOUNTER — OUTPATIENT (OUTPATIENT)
Dept: OUTPATIENT SERVICES | Facility: HOSPITAL | Age: 28
LOS: 1 days | End: 2023-09-13
Payer: COMMERCIAL

## 2023-09-13 ENCOUNTER — APPOINTMENT (OUTPATIENT)
Dept: OBGYN | Facility: CLINIC | Age: 28
End: 2023-09-13
Payer: COMMERCIAL

## 2023-09-13 DIAGNOSIS — Z00.00 ENCOUNTER FOR GENERAL ADULT MEDICAL EXAMINATION WITHOUT ABNORMAL FINDINGS: ICD-10-CM

## 2023-09-13 PROCEDURE — 99442: CPT

## 2023-09-13 PROCEDURE — 99213 OFFICE O/P EST LOW 20 MIN: CPT

## 2023-09-18 DIAGNOSIS — E28.2 POLYCYSTIC OVARIAN SYNDROME: ICD-10-CM

## 2023-09-26 ENCOUNTER — APPOINTMENT (OUTPATIENT)
Dept: OBGYN | Facility: CLINIC | Age: 28
End: 2023-09-26

## 2023-10-10 ENCOUNTER — APPOINTMENT (OUTPATIENT)
Dept: OBGYN | Facility: CLINIC | Age: 28
End: 2023-10-10

## 2023-12-08 ENCOUNTER — EMERGENCY (EMERGENCY)
Facility: HOSPITAL | Age: 28
LOS: 0 days | Discharge: HOME | End: 2023-12-08
Attending: STUDENT IN AN ORGANIZED HEALTH CARE EDUCATION/TRAINING PROGRAM
Payer: COMMERCIAL

## 2023-12-08 VITALS
DIASTOLIC BLOOD PRESSURE: 76 MMHG | SYSTOLIC BLOOD PRESSURE: 119 MMHG | HEART RATE: 72 BPM | HEIGHT: 66 IN | TEMPERATURE: 99 F | WEIGHT: 250 LBS | RESPIRATION RATE: 18 BRPM | OXYGEN SATURATION: 99 %

## 2023-12-08 DIAGNOSIS — M62.838 OTHER MUSCLE SPASM: ICD-10-CM

## 2023-12-08 DIAGNOSIS — M54.2 CERVICALGIA: ICD-10-CM

## 2023-12-08 DIAGNOSIS — M54.9 DORSALGIA, UNSPECIFIED: ICD-10-CM

## 2023-12-08 PROCEDURE — 99283 EMERGENCY DEPT VISIT LOW MDM: CPT | Mod: 25

## 2023-12-08 PROCEDURE — 99284 EMERGENCY DEPT VISIT MOD MDM: CPT

## 2023-12-08 PROCEDURE — 96372 THER/PROPH/DIAG INJ SC/IM: CPT | Mod: XU

## 2023-12-08 PROCEDURE — 20552 NJX 1/MLT TRIGGER POINT 1/2: CPT

## 2023-12-08 RX ORDER — DIAZEPAM 5 MG
5 TABLET ORAL ONCE
Refills: 0 | Status: DISCONTINUED | OUTPATIENT
Start: 2023-12-08 | End: 2023-12-08

## 2023-12-08 RX ORDER — KETOROLAC TROMETHAMINE 30 MG/ML
30 SYRINGE (ML) INJECTION ONCE
Refills: 0 | Status: DISCONTINUED | OUTPATIENT
Start: 2023-12-08 | End: 2023-12-08

## 2023-12-08 RX ORDER — METHOCARBAMOL 500 MG/1
1000 TABLET, FILM COATED ORAL ONCE
Refills: 0 | Status: COMPLETED | OUTPATIENT
Start: 2023-12-08 | End: 2023-12-08

## 2023-12-08 RX ORDER — METHOCARBAMOL 500 MG/1
2 TABLET, FILM COATED ORAL
Qty: 80 | Refills: 0
Start: 2023-12-08 | End: 2023-12-17

## 2023-12-08 RX ORDER — LIDOCAINE HCL 20 MG/ML
10 VIAL (ML) INJECTION ONCE
Refills: 0 | Status: DISCONTINUED | OUTPATIENT
Start: 2023-12-08 | End: 2023-12-08

## 2023-12-08 RX ADMIN — METHOCARBAMOL 1000 MILLIGRAM(S): 500 TABLET, FILM COATED ORAL at 11:54

## 2023-12-08 RX ADMIN — Medication 5 MILLIGRAM(S): at 12:24

## 2023-12-08 RX ADMIN — Medication 30 MILLIGRAM(S): at 11:54

## 2023-12-08 NOTE — ED PROVIDER NOTE - NSFOLLOWUPINSTRUCTIONS_ED_ALL_ED_FT
Muscle Spasm    WHAT YOU NEED TO KNOW:    A muscle spasm is a sudden contraction of any muscle or group of muscles. A muscle cramp is a painful muscle spasm. Muscle cramps commonly occur after intense exercise or during pregnancy. They may also be caused by certain medications, dehydration, low calcium or magnesium levels, or another medical condition.    DISCHARGE INSTRUCTIONS:    Medicines: You may need the following:    NSAIDs help decrease swelling and pain or fever. This medicine is available with or without a doctor's order. NSAIDs can cause stomach bleeding or kidney problems in certain people. If you take blood thinner medicine, always ask your healthcare provider if NSAIDs are safe for you. Always read the medicine label and follow directions.    Take your medicine as directed. Contact your healthcare provider if you think your medicine is not helping or if you have side effects. Tell your provider if you are allergic to any medicine. Keep a list of the medicines, vitamins, and herbs you take. Include the amounts, and when and why you take them. Bring the list or the pill bottles to follow-up visits. Carry your medicine list with you in case of an emergency.  Follow up with your healthcare provider as directed: You may need other tests or treatment. You may also be referred to a physical therapist or other specialist. Write down your questions so you remember to ask them during your visits.    Self-care:    Stretch your muscle to help relieve the cramp. It may be helpful to keep your muscle in the stretched position until the cramp is gone.    Apply heat to help decrease pain and muscle spasms. Apply heat on the area for 20 to 30 minutes every 2 hours for as many days as directed.    Apply ice to help decrease swelling and pain. Ice may also help prevent tissue damage. Use an ice pack, or put crushed ice in a plastic bag. Cover it with a towel and place it on your muscle for 15 to 20 minutes every hour or as directed.    Drink more liquids to help prevent muscle cramps caused by dehydration. Sports drinks may help replace electrolytes you lose through sweat during exercise. Ask your healthcare provider how much liquid to drink each day and which liquids are best for you.    Eat healthy foods, such as fruits, vegetables, whole grains, low-fat dairy products, and lean proteins (meat, beans, and fish). If you are pregnant, ask your healthcare provider about foods that are high in magnesium and sodium. They may help to relieve cramps during pregnancy.    Massage your muscle to help relieve the cramp.    Take frequent deep breaths until the cramp feels better. Lie down while you take the deep breaths so you do not get dizzy or lightheaded.  Contact your healthcare provider if:    You have signs of dehydration, such as a headache, dark yellow urine, dry eyes or mouth, or a fast heartbeat.    You have questions or concerns about your condition or care.  Return to the emergency department if:    You have warmth, swelling, or redness in the cramping muscle.    You have frequent or unrelieved muscle cramps in several different muscles.    You have muscle cramps with numbness, tingling, and burning in your hands and feet.

## 2023-12-08 NOTE — ED PROVIDER NOTE - PHYSICAL EXAMINATION
GEN: Patient in no acute distress  MS: L trap and paraspinal cervical tenderness. + muscle spasms noted. Normal ROM in all extremities. No midline spinal tenderness. pulses 2 +.  SKIN: Warm, dry, no acute rashes. Good turgor  NEURO: Strength 5/5 with no sensory deficits. Steady gait.

## 2023-12-08 NOTE — ED PROVIDER NOTE - CLINICAL SUMMARY MEDICAL DECISION MAKING FREE TEXT BOX
Orders placed for colonoscopy, please ensure patient has read my message, thank you!   28-year-old female, no pertinent past medical history, presenting for sudden onset left neck spasm that started when she was looking for something and reaching forward with her left arm under the bed, worse with movement, improved at rest.  Denies fevers, chills, trauma, nausea, vomiting, chest pain, shortness of breath.  No midline C-spine tenderness.  Tenderness to left paraspinal left trapezius.  Limited range of motion of neck as patient is fearful of spasming or turning.  Full range of motion of left hand, wrist, elbow.  Limited exam of shoulder.  Medications given and effects reassessed.  Given return precautions and follow-up outpatient.  Patient comfortable with plan.  Patient feels much better.

## 2023-12-08 NOTE — ED PROVIDER NOTE - OBJECTIVE STATEMENT
28 year old female with pmhx of pseudotumor cerebri presents with left neck and back pain. Pain began this morning after leaning under the bed to get something. Pain worse with movement. Pt denies ha, dizziness, numbness or tingling to arm.

## 2023-12-08 NOTE — ED PROVIDER NOTE - CARE PROVIDER_API CALL
Chapo Wiggins  Orthopaedic Surgery  [address] [place] [address]  Phone: [phone]  Fax: [phone]  Follow Up Time:    Chapo Wiggins  Orthopaedic Surgery  [address] [place] [address]  Phone: [phone]  Fax: [phone]  Follow Up Time:

## 2023-12-08 NOTE — ED ADULT NURSE NOTE - NSFALLUNIVINTERV_ED_ALL_ED
Bed/Stretcher in lowest position, wheels locked, appropriate side rails in place/Call bell, personal items and telephone in reach/Instruct patient to call for assistance before getting out of bed/chair/stretcher/Non-slip footwear applied when patient is off stretcher/[place] to call system/Physically safe environment - no spills, clutter or unnecessary equipment/Purposeful proactive rounding/Room/bathroom lighting operational, light cord in reach Bed/Stretcher in lowest position, wheels locked, appropriate side rails in place/Call bell, personal items and telephone in reach/Instruct patient to call for assistance before getting out of bed/chair/stretcher/Non-slip footwear applied when patient is off stretcher/[place] to call system/Physically safe environment - no spills, clutter or unnecessary equipment/Purposeful proactive rounding/Room/bathroom lighting operational, light cord in reach

## 2023-12-08 NOTE — ED PROVIDER NOTE - PATIENT PORTAL LINK FT
You can access the FollowMyHealth Patient Portal offered by [facility] by registering at the following website: [email or web address]. By joining [employer]’s FollowMyHealth portal, you will also be able to view your health information using other applications (apps) compatible with our system. You can access the FollowMyHealth Patient Portal offered by [facility] by registering at the following website: [email or web address]. By joining [facility]’s FollowMyHealth portal, you will also be able to view your health information using other applications (apps) compatible with our system.

## 2023-12-19 NOTE — ED ADULT NURSE NOTE - NSSUBSTANCEUSE_GEN_ALL_CORE_SD
Attempted to reach patient for:  RNCM follow up.    Outcome:  No answer.    Next Follow Up: Later today, 12/19.   never used

## 2024-02-11 ENCOUNTER — EMERGENCY (EMERGENCY)
Facility: HOSPITAL | Age: 29
LOS: 0 days | Discharge: HOME | End: 2024-02-11
Attending: EMERGENCY MEDICINE
Payer: COMMERCIAL

## 2024-02-11 VITALS
HEART RATE: 93 BPM | SYSTOLIC BLOOD PRESSURE: 143 MMHG | HEIGHT: 66 IN | RESPIRATION RATE: 20 BRPM | TEMPERATURE: 98 F | OXYGEN SATURATION: 99 % | WEIGHT: 235.01 LBS | DIASTOLIC BLOOD PRESSURE: 68 MMHG

## 2024-02-11 DIAGNOSIS — H57.11 OCULAR PAIN, RIGHT EYE: ICD-10-CM
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
## 2024-02-11 DIAGNOSIS — S05.01XA INJURY OF CONJUNCTIVA AND CORNEAL ABRASION WITHOUT FOREIGN BODY, RIGHT EYE, INITIAL ENCOUNTER: ICD-10-CM
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
## 2024-02-11 PROCEDURE — 99284 EMERGENCY DEPT VISIT MOD MDM: CPT

## 2024-02-11 PROCEDURE — 99283 EMERGENCY DEPT VISIT LOW MDM: CPT

## 2024-02-11 RX ORDER — MOXIFLOXACIN HCL 0.5 %
1 DROPS OPHTHALMIC (EYE)
Qty: 10 | Refills: 0
Start: 2024-02-11 | End: 2024-02-17

## 2024-02-11 NOTE — ED PROVIDER NOTE - OBJECTIVE STATEMENT
27 y/o female presents to the Ed with right eye pain s/p scratched by dog paw this am . patient c/o light sensitivity and excessive tearing. patient wears contacts . patient denies any other injuries. patient with redness to eye.

## 2024-02-11 NOTE — ED PROVIDER NOTE - CPE EDP SKIN NORM
normal... Will develop a suicide prevention/safety plan Talk to staff and ask for assistance when having suicidal wishes instead of acting out Will develop a suicide prevention/safety plan Will develop a suicide prevention/safety plan Will develop a suicide prevention/safety plan Will develop a suicide prevention/safety plan

## 2024-02-11 NOTE — ED PROVIDER NOTE - CLINICAL SUMMARY MEDICAL DECISION MAKING FREE TEXT BOX
Patient presents with right eye pain. + corneal abrasion noted on exam. antibiotic drops given. Discharged with optho follow up and return precautions.

## 2024-02-11 NOTE — ED PROVIDER NOTE - PATIENT PORTAL LINK FT
You can access the FollowMyHealth Patient Portal offered by [facility] by registering at the following website: [email or web address]. By joining [facility]’s FollowMyHealth portal, you will also be able to view your health information using other applications (apps) compatible with our system.

## 2024-02-11 NOTE — ED PROVIDER NOTE - NSFOLLOWUPINSTRUCTIONS_ED_ALL_ED_FT
Our Emergency Department Referral Coordinators will be reaching out to you in the next 24-48 hours from 9:00am to 5:00pm with a follow up appointment. Please expect a phone call from the hospital in that time frame. If you do not receive a call or if you have any questions or concerns, you can reach them at   [phone]      Corneal Abrasion    The cornea is the clear covering at the front and center of the eye. This very thin tissue is made up of many layers. If a scratch or injury causes the corneal epithelium to come off, it is called a corneal abrasion. Symptoms include eye pain, redness, tearing, difficulty keeping eye open, and light sensitivity. Do not drive or operate machinery if your eye is patched.  Antibiotic eye drops may be prescribed to reduce the risk of infection.  It is important to follow up with an ophthalmologist (eye doctor) to ensure proper healing.    SEEK IMMEDIATE MEDICAL CARE IF YOU HAVE ANY OF THE FOLLOWING SYMPTOMS: discharge from eyes, changes in vision, fever, or swelling.

## 2024-02-11 NOTE — ED PROVIDER NOTE - ATTENDING APP SHARED VISIT CONTRIBUTION OF CARE
28-year-old female past medical pseudotumor cerebri presents with right eye injury.  States that her dog scratched her right eye earlier this morning.  Since has been having tearing and light sensitivity to the eye.  Also reports pain.  States that she wore glasses and contacts.  Does not wear contacts over the injury.  No fevers or recent illness.  Follows with an optometrist.    CONSTITUTIONAL: Well-developed; well-nourished; in no acute distress.   SKIN: warm, dry  HEAD: Normocephalic; atraumatic.  EYES: PERRL, EOMI, + right corneal abrasion on flurocein vasquez, negative sidel sign.   EXT: Normal ROM.  5/5 strength in all 4 extremities   LYMPH: No acute cervical adenopathy.  NEURO: Alert, oriented, grossly unremarkable. neurovascularly intact

## 2024-02-11 NOTE — ED ADULT NURSE NOTE - NSFALLUNIVINTERV_ED_ALL_ED
Bed/Stretcher in lowest position, wheels locked, appropriate side rails in place/Call bell, personal items and telephone in reach/Instruct patient to call for assistance before getting out of bed/chair/stretcher/Non-slip footwear applied when patient is off stretcher/[place] to call system/Physically safe environment - no spills, clutter or unnecessary equipment/Purposeful proactive rounding/Room/bathroom lighting operational, light cord in reach

## 2024-07-05 ENCOUNTER — EMERGENCY (EMERGENCY)
Facility: HOSPITAL | Age: 29
LOS: 0 days | Discharge: HOME | End: 2024-07-06
Attending: EMERGENCY MEDICINE
Payer: COMMERCIAL

## 2024-07-05 VITALS
RESPIRATION RATE: 18 BRPM | WEIGHT: 259.93 LBS | OXYGEN SATURATION: 100 % | HEIGHT: 66 IN | TEMPERATURE: 98 F | SYSTOLIC BLOOD PRESSURE: 123 MMHG | HEART RATE: 84 BPM | DIASTOLIC BLOOD PRESSURE: 85 MMHG

## 2024-07-05 DIAGNOSIS — R07.89 OTHER CHEST PAIN: ICD-10-CM

## 2024-07-05 DIAGNOSIS — R05.1 ACUTE COUGH: ICD-10-CM

## 2024-07-05 DIAGNOSIS — F17.200 NICOTINE DEPENDENCE, UNSPECIFIED, UNCOMPLICATED: ICD-10-CM

## 2024-07-05 DIAGNOSIS — J45.909 UNSPECIFIED ASTHMA, UNCOMPLICATED: ICD-10-CM

## 2024-07-05 LAB
ALBUMIN SERPL ELPH-MCNC: 4 G/DL — SIGNIFICANT CHANGE UP (ref 3.5–5.2)
ALP SERPL-CCNC: 70 U/L — SIGNIFICANT CHANGE UP (ref 30–115)
ALT FLD-CCNC: 14 U/L — SIGNIFICANT CHANGE UP (ref 0–41)
ANION GAP SERPL CALC-SCNC: 10 MMOL/L — SIGNIFICANT CHANGE UP (ref 7–14)
AST SERPL-CCNC: 14 U/L — SIGNIFICANT CHANGE UP (ref 0–41)
BASOPHILS # BLD AUTO: 0.04 K/UL — SIGNIFICANT CHANGE UP (ref 0–0.2)
BASOPHILS NFR BLD AUTO: 0.3 % — SIGNIFICANT CHANGE UP (ref 0–1)
BILIRUB SERPL-MCNC: <0.2 MG/DL — SIGNIFICANT CHANGE UP (ref 0.2–1.2)
BUN SERPL-MCNC: 10 MG/DL — SIGNIFICANT CHANGE UP (ref 10–20)
CALCIUM SERPL-MCNC: 9.4 MG/DL — SIGNIFICANT CHANGE UP (ref 8.4–10.5)
CHLORIDE SERPL-SCNC: 101 MMOL/L — SIGNIFICANT CHANGE UP (ref 98–110)
CO2 SERPL-SCNC: 26 MMOL/L — SIGNIFICANT CHANGE UP (ref 17–32)
CREAT SERPL-MCNC: 0.7 MG/DL — SIGNIFICANT CHANGE UP (ref 0.7–1.5)
EGFR: 120 ML/MIN/1.73M2 — SIGNIFICANT CHANGE UP
EOSINOPHIL # BLD AUTO: 0.06 K/UL — SIGNIFICANT CHANGE UP (ref 0–0.7)
EOSINOPHIL NFR BLD AUTO: 0.5 % — SIGNIFICANT CHANGE UP (ref 0–8)
GLUCOSE SERPL-MCNC: 110 MG/DL — HIGH (ref 70–99)
HCG SERPL QL: NEGATIVE — SIGNIFICANT CHANGE UP
HCT VFR BLD CALC: 37.8 % — SIGNIFICANT CHANGE UP (ref 37–47)
HGB BLD-MCNC: 12.6 G/DL — SIGNIFICANT CHANGE UP (ref 12–16)
IMM GRANULOCYTES NFR BLD AUTO: 0.3 % — SIGNIFICANT CHANGE UP (ref 0.1–0.3)
LYMPHOCYTES # BLD AUTO: 3.98 K/UL — HIGH (ref 1.2–3.4)
LYMPHOCYTES # BLD AUTO: 33.2 % — SIGNIFICANT CHANGE UP (ref 20.5–51.1)
MAGNESIUM SERPL-MCNC: 1.9 MG/DL — SIGNIFICANT CHANGE UP (ref 1.8–2.4)
MCHC RBC-ENTMCNC: 26.9 PG — LOW (ref 27–31)
MCHC RBC-ENTMCNC: 33.3 G/DL — SIGNIFICANT CHANGE UP (ref 32–37)
MCV RBC AUTO: 80.8 FL — LOW (ref 81–99)
MONOCYTES # BLD AUTO: 1.01 K/UL — HIGH (ref 0.1–0.6)
MONOCYTES NFR BLD AUTO: 8.4 % — SIGNIFICANT CHANGE UP (ref 1.7–9.3)
NEUTROPHILS # BLD AUTO: 6.84 K/UL — HIGH (ref 1.4–6.5)
NEUTROPHILS NFR BLD AUTO: 57.3 % — SIGNIFICANT CHANGE UP (ref 42.2–75.2)
NRBC # BLD: 0 /100 WBCS — SIGNIFICANT CHANGE UP (ref 0–0)
NT-PROBNP SERPL-SCNC: 62 PG/ML — SIGNIFICANT CHANGE UP (ref 0–300)
PLATELET # BLD AUTO: 362 K/UL — SIGNIFICANT CHANGE UP (ref 130–400)
PMV BLD: 9 FL — SIGNIFICANT CHANGE UP (ref 7.4–10.4)
POTASSIUM SERPL-MCNC: 4.5 MMOL/L — SIGNIFICANT CHANGE UP (ref 3.5–5)
POTASSIUM SERPL-SCNC: 4.5 MMOL/L — SIGNIFICANT CHANGE UP (ref 3.5–5)
PROT SERPL-MCNC: 7 G/DL — SIGNIFICANT CHANGE UP (ref 6–8)
RBC # BLD: 4.68 M/UL — SIGNIFICANT CHANGE UP (ref 4.2–5.4)
RBC # FLD: 12 % — SIGNIFICANT CHANGE UP (ref 11.5–14.5)
SODIUM SERPL-SCNC: 137 MMOL/L — SIGNIFICANT CHANGE UP (ref 135–146)
TROPONIN T, HIGH SENSITIVITY RESULT: <6 NG/L — SIGNIFICANT CHANGE UP (ref 6–13)
WBC # BLD: 11.97 K/UL — HIGH (ref 4.8–10.8)
WBC # FLD AUTO: 11.97 K/UL — HIGH (ref 4.8–10.8)

## 2024-07-05 PROCEDURE — 71275 CT ANGIOGRAPHY CHEST: CPT | Mod: 26,MC

## 2024-07-05 PROCEDURE — 71045 X-RAY EXAM CHEST 1 VIEW: CPT

## 2024-07-05 PROCEDURE — 99285 EMERGENCY DEPT VISIT HI MDM: CPT

## 2024-07-05 PROCEDURE — 84703 CHORIONIC GONADOTROPIN ASSAY: CPT

## 2024-07-05 PROCEDURE — 99285 EMERGENCY DEPT VISIT HI MDM: CPT | Mod: 25

## 2024-07-05 PROCEDURE — 84484 ASSAY OF TROPONIN QUANT: CPT

## 2024-07-05 PROCEDURE — 93005 ELECTROCARDIOGRAM TRACING: CPT

## 2024-07-05 PROCEDURE — 93010 ELECTROCARDIOGRAM REPORT: CPT

## 2024-07-05 PROCEDURE — 83880 ASSAY OF NATRIURETIC PEPTIDE: CPT

## 2024-07-05 PROCEDURE — 96374 THER/PROPH/DIAG INJ IV PUSH: CPT | Mod: XU

## 2024-07-05 PROCEDURE — 36415 COLL VENOUS BLD VENIPUNCTURE: CPT

## 2024-07-05 PROCEDURE — 71045 X-RAY EXAM CHEST 1 VIEW: CPT | Mod: 26

## 2024-07-05 PROCEDURE — 80053 COMPREHEN METABOLIC PANEL: CPT

## 2024-07-05 PROCEDURE — 71275 CT ANGIOGRAPHY CHEST: CPT | Mod: MC

## 2024-07-05 PROCEDURE — 94640 AIRWAY INHALATION TREATMENT: CPT

## 2024-07-05 PROCEDURE — 83735 ASSAY OF MAGNESIUM: CPT

## 2024-07-05 PROCEDURE — 85025 COMPLETE CBC W/AUTO DIFF WBC: CPT

## 2024-07-05 RX ORDER — DEXTROSE MONOHYDRATE AND SODIUM CHLORIDE 5; .3 G/100ML; G/100ML
1000 INJECTION, SOLUTION INTRAVENOUS ONCE
Refills: 0 | Status: COMPLETED | OUTPATIENT
Start: 2024-07-05 | End: 2024-07-05

## 2024-07-05 RX ORDER — KETOROLAC TROMETHAMINE 30 MG/ML
15 INJECTION, SOLUTION INTRAMUSCULAR ONCE
Refills: 0 | Status: DISCONTINUED | OUTPATIENT
Start: 2024-07-05 | End: 2024-07-05

## 2024-07-05 RX ADMIN — DEXTROSE MONOHYDRATE AND SODIUM CHLORIDE 1000 MILLILITER(S): 5; .3 INJECTION, SOLUTION INTRAVENOUS at 21:53

## 2024-07-06 VITALS
RESPIRATION RATE: 16 BRPM | HEART RATE: 62 BPM | SYSTOLIC BLOOD PRESSURE: 127 MMHG | OXYGEN SATURATION: 97 % | TEMPERATURE: 98 F | DIASTOLIC BLOOD PRESSURE: 75 MMHG

## 2024-07-06 RX ORDER — ALBUTEROL 90 MCG
2 AEROSOL REFILL (GRAM) INHALATION ONCE
Refills: 0 | Status: COMPLETED | OUTPATIENT
Start: 2024-07-06 | End: 2024-07-06

## 2024-07-06 RX ORDER — ALBUTEROL 90 MCG
2 AEROSOL REFILL (GRAM) INHALATION
Qty: 1 | Refills: 0
Start: 2024-07-06 | End: 2024-08-04

## 2024-07-06 RX ADMIN — Medication 2 PUFF(S): at 00:20

## 2024-07-06 RX ADMIN — KETOROLAC TROMETHAMINE 15 MILLIGRAM(S): 30 INJECTION, SOLUTION INTRAMUSCULAR at 00:14

## 2024-08-26 PROBLEM — J45.909 UNSPECIFIED ASTHMA, UNCOMPLICATED: Chronic | Status: ACTIVE | Noted: 2024-07-05

## 2024-08-26 PROBLEM — G93.2 BENIGN INTRACRANIAL HYPERTENSION: Chronic | Status: ACTIVE | Noted: 2024-07-05

## 2024-09-17 ENCOUNTER — EMERGENCY (EMERGENCY)
Facility: HOSPITAL | Age: 29
LOS: 0 days | Discharge: HOME | End: 2024-09-17
Attending: EMERGENCY MEDICINE
Payer: COMMERCIAL

## 2024-09-17 VITALS
SYSTOLIC BLOOD PRESSURE: 129 MMHG | OXYGEN SATURATION: 99 % | HEART RATE: 87 BPM | DIASTOLIC BLOOD PRESSURE: 87 MMHG | RESPIRATION RATE: 18 BRPM

## 2024-09-17 VITALS
TEMPERATURE: 97 F | SYSTOLIC BLOOD PRESSURE: 120 MMHG | RESPIRATION RATE: 20 BRPM | DIASTOLIC BLOOD PRESSURE: 88 MMHG | HEART RATE: 104 BPM | WEIGHT: 257.94 LBS | OXYGEN SATURATION: 98 %

## 2024-09-17 DIAGNOSIS — R11.0 NAUSEA: ICD-10-CM

## 2024-09-17 DIAGNOSIS — R10.11 RIGHT UPPER QUADRANT PAIN: ICD-10-CM

## 2024-09-17 DIAGNOSIS — R10.13 EPIGASTRIC PAIN: ICD-10-CM

## 2024-09-17 LAB
ALBUMIN SERPL ELPH-MCNC: 4 G/DL — SIGNIFICANT CHANGE UP (ref 3.5–5.2)
ALP SERPL-CCNC: 60 U/L — SIGNIFICANT CHANGE UP (ref 30–115)
ALT FLD-CCNC: 14 U/L — SIGNIFICANT CHANGE UP (ref 0–41)
ANION GAP SERPL CALC-SCNC: 9 MMOL/L — SIGNIFICANT CHANGE UP (ref 7–14)
APPEARANCE UR: ABNORMAL
AST SERPL-CCNC: 16 U/L — SIGNIFICANT CHANGE UP (ref 0–41)
BACTERIA # UR AUTO: ABNORMAL /HPF
BASOPHILS # BLD AUTO: 0.04 K/UL — SIGNIFICANT CHANGE UP (ref 0–0.2)
BASOPHILS NFR BLD AUTO: 0.4 % — SIGNIFICANT CHANGE UP (ref 0–1)
BILIRUB DIRECT SERPL-MCNC: <0.2 MG/DL — SIGNIFICANT CHANGE UP (ref 0–0.3)
BILIRUB INDIRECT FLD-MCNC: >0.1 MG/DL — LOW (ref 0.2–1.2)
BILIRUB SERPL-MCNC: 0.3 MG/DL — SIGNIFICANT CHANGE UP (ref 0.2–1.2)
BILIRUB UR-MCNC: NEGATIVE — SIGNIFICANT CHANGE UP
BUN SERPL-MCNC: 10 MG/DL — SIGNIFICANT CHANGE UP (ref 10–20)
CALCIUM SERPL-MCNC: 8.8 MG/DL — SIGNIFICANT CHANGE UP (ref 8.4–10.5)
CHLORIDE SERPL-SCNC: 104 MMOL/L — SIGNIFICANT CHANGE UP (ref 98–110)
CO2 SERPL-SCNC: 26 MMOL/L — SIGNIFICANT CHANGE UP (ref 17–32)
COLOR SPEC: YELLOW — SIGNIFICANT CHANGE UP
CREAT SERPL-MCNC: 0.6 MG/DL — LOW (ref 0.7–1.5)
DIFF PNL FLD: NEGATIVE — SIGNIFICANT CHANGE UP
EGFR: 125 ML/MIN/1.73M2 — SIGNIFICANT CHANGE UP
EOSINOPHIL # BLD AUTO: 0.09 K/UL — SIGNIFICANT CHANGE UP (ref 0–0.7)
EOSINOPHIL NFR BLD AUTO: 1 % — SIGNIFICANT CHANGE UP (ref 0–8)
EPI CELLS # UR: PRESENT
GLUCOSE SERPL-MCNC: 93 MG/DL — SIGNIFICANT CHANGE UP (ref 70–99)
GLUCOSE UR QL: NEGATIVE MG/DL — SIGNIFICANT CHANGE UP
HCG SERPL QL: NEGATIVE — SIGNIFICANT CHANGE UP
HCT VFR BLD CALC: 35.2 % — LOW (ref 37–47)
HGB BLD-MCNC: 11.5 G/DL — LOW (ref 12–16)
IMM GRANULOCYTES NFR BLD AUTO: 0.2 % — SIGNIFICANT CHANGE UP (ref 0.1–0.3)
KETONES UR-MCNC: NEGATIVE MG/DL — SIGNIFICANT CHANGE UP
LEUKOCYTE ESTERASE UR-ACNC: ABNORMAL
LIDOCAIN IGE QN: 32 U/L — SIGNIFICANT CHANGE UP (ref 7–60)
LYMPHOCYTES # BLD AUTO: 3.87 K/UL — HIGH (ref 1.2–3.4)
LYMPHOCYTES # BLD AUTO: 43.5 % — SIGNIFICANT CHANGE UP (ref 20.5–51.1)
MCHC RBC-ENTMCNC: 26.8 PG — LOW (ref 27–31)
MCHC RBC-ENTMCNC: 32.7 G/DL — SIGNIFICANT CHANGE UP (ref 32–37)
MCV RBC AUTO: 82.1 FL — SIGNIFICANT CHANGE UP (ref 81–99)
MONOCYTES # BLD AUTO: 0.72 K/UL — HIGH (ref 0.1–0.6)
MONOCYTES NFR BLD AUTO: 8.1 % — SIGNIFICANT CHANGE UP (ref 1.7–9.3)
NEUTROPHILS # BLD AUTO: 4.16 K/UL — SIGNIFICANT CHANGE UP (ref 1.4–6.5)
NEUTROPHILS NFR BLD AUTO: 46.8 % — SIGNIFICANT CHANGE UP (ref 42.2–75.2)
NITRITE UR-MCNC: NEGATIVE — SIGNIFICANT CHANGE UP
NRBC # BLD: 0 /100 WBCS — SIGNIFICANT CHANGE UP (ref 0–0)
PH UR: 6 — SIGNIFICANT CHANGE UP (ref 5–8)
PLATELET # BLD AUTO: 339 K/UL — SIGNIFICANT CHANGE UP (ref 130–400)
PMV BLD: 9.3 FL — SIGNIFICANT CHANGE UP (ref 7.4–10.4)
POTASSIUM SERPL-MCNC: 4 MMOL/L — SIGNIFICANT CHANGE UP (ref 3.5–5)
POTASSIUM SERPL-SCNC: 4 MMOL/L — SIGNIFICANT CHANGE UP (ref 3.5–5)
PROT SERPL-MCNC: 6.6 G/DL — SIGNIFICANT CHANGE UP (ref 6–8)
PROT UR-MCNC: SIGNIFICANT CHANGE UP MG/DL
RBC # BLD: 4.29 M/UL — SIGNIFICANT CHANGE UP (ref 4.2–5.4)
RBC # FLD: 12.4 % — SIGNIFICANT CHANGE UP (ref 11.5–14.5)
RBC CASTS # UR COMP ASSIST: 2 /HPF — SIGNIFICANT CHANGE UP (ref 0–4)
SODIUM SERPL-SCNC: 139 MMOL/L — SIGNIFICANT CHANGE UP (ref 135–146)
SP GR SPEC: 1.03 — SIGNIFICANT CHANGE UP (ref 1–1.03)
SQUAMOUS # UR AUTO: 20 /HPF — HIGH (ref 0–5)
UROBILINOGEN FLD QL: 1 MG/DL — SIGNIFICANT CHANGE UP (ref 0.2–1)
WBC # BLD: 8.9 K/UL — SIGNIFICANT CHANGE UP (ref 4.8–10.8)
WBC # FLD AUTO: 8.9 K/UL — SIGNIFICANT CHANGE UP (ref 4.8–10.8)
WBC UR QL: 8 /HPF — HIGH (ref 0–5)

## 2024-09-17 PROCEDURE — 76705 ECHO EXAM OF ABDOMEN: CPT

## 2024-09-17 PROCEDURE — 36000 PLACE NEEDLE IN VEIN: CPT

## 2024-09-17 PROCEDURE — 81001 URINALYSIS AUTO W/SCOPE: CPT

## 2024-09-17 PROCEDURE — 85025 COMPLETE CBC W/AUTO DIFF WBC: CPT

## 2024-09-17 PROCEDURE — 99284 EMERGENCY DEPT VISIT MOD MDM: CPT | Mod: 25

## 2024-09-17 PROCEDURE — 99284 EMERGENCY DEPT VISIT MOD MDM: CPT

## 2024-09-17 PROCEDURE — 76705 ECHO EXAM OF ABDOMEN: CPT | Mod: 26

## 2024-09-17 PROCEDURE — 36415 COLL VENOUS BLD VENIPUNCTURE: CPT

## 2024-09-17 PROCEDURE — 80076 HEPATIC FUNCTION PANEL: CPT

## 2024-09-17 PROCEDURE — 80048 BASIC METABOLIC PNL TOTAL CA: CPT

## 2024-09-17 PROCEDURE — 84703 CHORIONIC GONADOTROPIN ASSAY: CPT

## 2024-09-17 PROCEDURE — 83690 ASSAY OF LIPASE: CPT

## 2024-09-17 RX ORDER — SODIUM CHLORIDE 9 MG/ML
1000 INJECTION INTRAMUSCULAR; INTRAVENOUS; SUBCUTANEOUS ONCE
Refills: 0 | Status: COMPLETED | OUTPATIENT
Start: 2024-09-17 | End: 2024-09-17

## 2024-09-17 RX ADMIN — SODIUM CHLORIDE 1000 MILLILITER(S): 9 INJECTION INTRAMUSCULAR; INTRAVENOUS; SUBCUTANEOUS at 20:48

## 2024-09-17 NOTE — ED PROVIDER NOTE - PATIENT PORTAL LINK FT
You can access the FollowMyHealth Patient Portal offered by [facility] by registering at the following website: [email or web address]. By joining [employer]’s FollowMyHealth portal, you will also be able to view your health information using other applications (apps) compatible with our system.

## 2024-09-17 NOTE — ED PROVIDER NOTE - CLINICAL SUMMARY MEDICAL DECISION MAKING FREE TEXT BOX
29yF  hx IIH, asthma pw 1 month fo  intermittent epigastric ruq pain  after eatin g-   has outpt  US appointment but not for a few weeks, ordered by her PMD.  so came to ED today . Pt currently without pain well appearing abd soft nontender  Labs resulted at the time of my review were noted to be within acceptable parameters  US: IMPRESSION:  Partially contracted gallbladder limits evaluation. No gallstones seen.        Patient to be discharged from ED in well appearing condition. Any available test results were discussed with and printed  for patient.  Verbal instructions given, including instructions to return to ED immediately for any new, worsening, or concerning symptoms. Limitations of ED work up discussed.  Patient reports understanding of above with capacity and insight. Written discharge instructions additionally given, including follow-up plan.

## 2024-09-17 NOTE — ED ADULT TRIAGE NOTE - TEMP AT ED ARRIVAL (C)
Clinical Pharmacy Services: Medication History    Ratna Awad is a 34 y.o. female presenting to [facility] for There are no admission diagnoses documented for this encounter.    She  has a past medical history of HPV in female; LGSIL of cervix of undetermined significance; and Seizures.    Allergies as of 10/22/2017   • (No Known Allergies)       Medication information was obtained from: medication bottles    Pharmacy and Phone Number: [facility] [phone]    Prior to Admission Medications     Prescriptions Last Dose Informant Patient Reported? Taking?    citalopram (CeleXA) 20 MG tablet  Medication Bottle Yes Yes    Take 20 mg by mouth Daily.    gabapentin (NEURONTIN) 400 MG capsule  Medication Bottle Yes Yes    Take 400 mg by mouth 3 (Three) Times a Day.    ibuprofen (ADVIL,MOTRIN) 800 MG tablet  Medication Bottle Yes Yes    Take 800 mg by mouth Every 8 (Eight) Hours As Needed for Mild Pain .    levETIRAcetam (KEPPRA) 500 MG tablet  Medication Bottle Yes Yes    Take 500 mg by mouth 2 (Two) Times a Day.    mirtazapine (REMERON) 15 MG tablet  Medication Bottle Yes Yes    Take 15 mg by mouth every night at bedtime.    tiZANidine (ZANAFLEX) 4 MG tablet  Medication Bottle Yes Yes    Take 4 mg by mouth Every 8 (Eight) Hours.    levonorgestrel (MIRENA, 52 MG,) 20 MCG/24HR IUD  Medication Bottle Yes No    1 each by Intrauterine route 1 (One) Time.            Medication notes: copied from medication bottles     This medication list is complete to the best of my knowledge as of 10/22/2017    Please call if you have any questions.    Daniel Ford  Medication History Technician   [id number]  10/22/2017 6:58 PM      
36.1

## 2024-09-17 NOTE — ED PROVIDER NOTE - NSFOLLOWUPINSTRUCTIONS_ED_ALL_ED_FT
Follow up with your primary care doctor and your GI doctor in 1-2 days     Acute Abdominal Pain    WHAT YOU NEED TO KNOW:    The cause of your abdominal pain may not be found. If a cause is found, treatment will depend on what the cause is.     DISCHARGE INSTRUCTIONS:    Return to the emergency department if:     You vomit blood or cannot stop vomiting.      You have blood in your bowel movement or it looks like tar.       You have bleeding from your rectum.       Your abdomen is larger than usual, more painful, and hard.       You have severe pain in your abdomen.       You stop passing gas and having bowel movements.       You feel weak, dizzy, or faint.    Contact your healthcare provider if:     You have a fever.      You have new signs and symptoms.      Your symptoms do not get better with treatment.       You have questions or concerns about your condition or care.    Medicines may be given to decrease pain, treat an infection, and manage your symptoms. Take your medicine as directed. Call your healthcare provider if you think your medicine is not helping or if you have side effects. Tell him if you are allergic to any medicine. Keep a list of the medicines, vitamins, and herbs you take. Include the amounts, and when and why you take them. Bring the list or the pill bottles to follow-up visits. Carry your medicine list with you in case of an emergency.    Manage your symptoms:     Apply heat on your abdomen for 20 to 30 minutes every 2 hours for as many days as directed. Heat helps decrease pain and muscle spasms.       Manage your stress. Stress may cause abdominal pain. Your healthcare provider may recommend relaxation techniques and deep breathing exercises to help decrease your stress. Your healthcare provider may recommend you talk to someone about your stress or anxiety, such as a counselor or a trusted friend. Get plenty of sleep and exercise regularly.       Limit or do not drink alcohol. Alcohol can make your abdominal pain worse. Ask your healthcare provider if it is safe for you to drink alcohol. Also ask how much is safe for you to drink.       Do not smoke. Nicotine and other chemicals in cigarettes can damage your esophagus and stomach. Ask your healthcare provider for information if you currently smoke and need help to quit. E-cigarettes or smokeless tobacco still contain nicotine. Talk to your healthcare provider before you use these products.     Make changes to the food you eat as directed: Do not eat foods that cause abdominal pain or other symptoms. Eat small meals more often.     Eat more high-fiber foods if you are constipated. High-fiber foods include fruits, vegetables, whole-grain foods, and legumes.       Do not eat foods that cause gas if you have bloating. Examples include broccoli, cabbage, and cauliflower. Do not drink soda or carbonated drinks, because these may also cause gas.       Do not eat foods or drinks that contain sorbitol or fructose if you have diarrhea and bloating. Some examples are fruit juices, candy, jelly, and sugar-free gum.       Do not eat high-fat foods, such as fried foods, cheeseburgers, hot dogs, and desserts.      Limit or do not drink caffeine. Caffeine may make symptoms, such as heart burn or nausea, worse.       Drink plenty of liquids to prevent dehydration from diarrhea or vomiting. Ask your healthcare provider how much liquid to drink each day and which liquids are best for you.     Follow up with your healthcare provider as directed: Write down your questions so you remember to ask them during your visits.       © Copyright [employer] 2019 All illustrations and images included in CareNotes are the copyrighted property of [employer] or [employer]

## 2024-10-29 ENCOUNTER — APPOINTMENT (OUTPATIENT)
Dept: OBGYN | Facility: CLINIC | Age: 29
End: 2024-10-29

## 2025-02-03 ENCOUNTER — APPOINTMENT (OUTPATIENT)
Dept: OBGYN | Facility: CLINIC | Age: 30
End: 2025-02-03
Payer: COMMERCIAL

## 2025-02-03 ENCOUNTER — OUTPATIENT (OUTPATIENT)
Dept: OUTPATIENT SERVICES | Facility: HOSPITAL | Age: 30
LOS: 1 days | End: 2025-02-03
Payer: COMMERCIAL

## 2025-02-03 VITALS
DIASTOLIC BLOOD PRESSURE: 79 MMHG | WEIGHT: 223.06 LBS | HEART RATE: 81 BPM | SYSTOLIC BLOOD PRESSURE: 124 MMHG | OXYGEN SATURATION: 100 % | HEIGHT: 66 IN | BODY MASS INDEX: 35.85 KG/M2

## 2025-02-03 DIAGNOSIS — Z11.3 ENCOUNTER FOR SCREENING FOR INFECTIONS WITH A PREDOMINANTLY SEXUAL MODE OF TRANSMISSION: ICD-10-CM

## 2025-02-03 DIAGNOSIS — N76.0 ACUTE VAGINITIS: ICD-10-CM

## 2025-02-03 PROCEDURE — 99213 OFFICE O/P EST LOW 20 MIN: CPT

## 2025-02-03 PROCEDURE — 87591 N.GONORRHOEAE DNA AMP PROB: CPT

## 2025-02-03 PROCEDURE — 87661 TRICHOMONAS VAGINALIS AMPLIF: CPT

## 2025-02-03 PROCEDURE — 81513 NFCT DS BV RNA VAG FLU ALG: CPT

## 2025-02-03 PROCEDURE — 87481 CANDIDA DNA AMP PROBE: CPT

## 2025-02-03 PROCEDURE — 87491 CHLMYD TRACH DNA AMP PROBE: CPT

## 2025-02-03 PROCEDURE — 99459 PELVIC EXAMINATION: CPT

## 2025-02-04 LAB
BV BACTERIA RRNA VAG QL NAA+PROBE: NOT DETECTED
C GLABRATA RNA VAG QL NAA+PROBE: NOT DETECTED
C TRACH RRNA SPEC QL NAA+PROBE: NOT DETECTED
CANDIDA RRNA VAG QL PROBE: DETECTED
N GONORRHOEA RRNA SPEC QL NAA+PROBE: NOT DETECTED
T VAGINALIS RRNA SPEC QL NAA+PROBE: NOT DETECTED

## 2025-02-04 RX ORDER — FLUCONAZOLE 150 MG/1
150 TABLET ORAL
Qty: 2 | Refills: 1 | Status: ACTIVE | COMMUNITY
Start: 2025-02-04 | End: 1900-01-01

## 2025-02-06 DIAGNOSIS — N76.0 ACUTE VAGINITIS: ICD-10-CM

## 2025-02-18 ENCOUNTER — OUTPATIENT (OUTPATIENT)
Dept: OUTPATIENT SERVICES | Facility: HOSPITAL | Age: 30
LOS: 1 days | End: 2025-02-18
Payer: COMMERCIAL

## 2025-02-18 ENCOUNTER — APPOINTMENT (OUTPATIENT)
Dept: OBGYN | Facility: CLINIC | Age: 30
End: 2025-02-18
Payer: COMMERCIAL

## 2025-02-18 VITALS
SYSTOLIC BLOOD PRESSURE: 105 MMHG | HEIGHT: 66 IN | HEART RATE: 85 BPM | WEIGHT: 221 LBS | BODY MASS INDEX: 35.52 KG/M2 | DIASTOLIC BLOOD PRESSURE: 73 MMHG | OXYGEN SATURATION: 100 %

## 2025-02-18 DIAGNOSIS — R92.30 DENSE BREASTS, UNSPECIFIED: ICD-10-CM

## 2025-02-18 DIAGNOSIS — Z01.419 ENCOUNTER FOR GYNECOLOGICAL EXAMINATION (GENERAL) (ROUTINE) WITHOUT ABNORMAL FINDINGS: ICD-10-CM

## 2025-02-18 DIAGNOSIS — Z80.3 FAMILY HISTORY OF MALIGNANT NEOPLASM OF BREAST: ICD-10-CM

## 2025-02-18 PROCEDURE — 87591 N.GONORRHOEAE DNA AMP PROB: CPT

## 2025-02-18 PROCEDURE — 87661 TRICHOMONAS VAGINALIS AMPLIF: CPT

## 2025-02-18 PROCEDURE — 99395 PREV VISIT EST AGE 18-39: CPT

## 2025-02-18 PROCEDURE — 81513 NFCT DS BV RNA VAG FLU ALG: CPT

## 2025-02-18 PROCEDURE — 88142 CYTOPATH C/V THIN LAYER: CPT

## 2025-02-18 PROCEDURE — 87481 CANDIDA DNA AMP PROBE: CPT

## 2025-02-18 PROCEDURE — 87491 CHLMYD TRACH DNA AMP PROBE: CPT

## 2025-02-18 PROCEDURE — 99459 PELVIC EXAMINATION: CPT

## 2025-02-19 ENCOUNTER — OUTPATIENT (OUTPATIENT)
Dept: OUTPATIENT SERVICES | Facility: HOSPITAL | Age: 30
LOS: 1 days | End: 2025-02-19

## 2025-02-19 DIAGNOSIS — R92.30 DENSE BREASTS, UNSPECIFIED: ICD-10-CM

## 2025-02-19 DIAGNOSIS — Z00.00 ENCOUNTER FOR GENERAL ADULT MEDICAL EXAMINATION WITHOUT ABNORMAL FINDINGS: ICD-10-CM

## 2025-02-19 DIAGNOSIS — Z80.3 FAMILY HISTORY OF MALIGNANT NEOPLASM OF BREAST: ICD-10-CM

## 2025-02-19 LAB
BV BACTERIA RRNA VAG QL NAA+PROBE: NOT DETECTED
C GLABRATA RNA VAG QL NAA+PROBE: NOT DETECTED
C TRACH RRNA SPEC QL NAA+PROBE: NOT DETECTED
CANDIDA RRNA VAG QL PROBE: NOT DETECTED
N GONORRHOEA RRNA SPEC QL NAA+PROBE: NOT DETECTED
T VAGINALIS RRNA SPEC QL NAA+PROBE: NOT DETECTED

## 2025-02-20 DIAGNOSIS — Z00.00 ENCOUNTER FOR GENERAL ADULT MEDICAL EXAMINATION WITHOUT ABNORMAL FINDINGS: ICD-10-CM

## 2025-02-26 LAB — CYTOLOGY CVX/VAG DOC THIN PREP: NORMAL

## 2025-03-04 ENCOUNTER — RESULT REVIEW (OUTPATIENT)
Age: 30
End: 2025-03-04

## 2025-03-04 ENCOUNTER — OUTPATIENT (OUTPATIENT)
Dept: OUTPATIENT SERVICES | Facility: HOSPITAL | Age: 30
LOS: 1 days | End: 2025-03-04
Payer: COMMERCIAL

## 2025-03-04 DIAGNOSIS — N63.20 UNSPECIFIED LUMP IN THE LEFT BREAST, UNSPECIFIED QUADRANT: ICD-10-CM

## 2025-03-04 DIAGNOSIS — N63.10 UNSPECIFIED LUMP IN THE RIGHT BREAST, UNSPECIFIED QUADRANT: ICD-10-CM

## 2025-03-04 DIAGNOSIS — Z12.31 ENCOUNTER FOR SCREENING MAMMOGRAM FOR MALIGNANT NEOPLASM OF BREAST: ICD-10-CM

## 2025-03-04 PROCEDURE — 76641 ULTRASOUND BREAST COMPLETE: CPT | Mod: 50

## 2025-03-04 PROCEDURE — 76641 ULTRASOUND BREAST COMPLETE: CPT | Mod: 26,50

## 2025-03-05 DIAGNOSIS — N63.10 UNSPECIFIED LUMP IN THE RIGHT BREAST, UNSPECIFIED QUADRANT: ICD-10-CM

## 2025-03-05 DIAGNOSIS — N63.20 UNSPECIFIED LUMP IN THE LEFT BREAST, UNSPECIFIED QUADRANT: ICD-10-CM

## 2025-04-23 RX ORDER — METRONIDAZOLE 500 MG/1
500 TABLET ORAL TWICE DAILY
Qty: 14 | Refills: 0 | Status: ACTIVE | COMMUNITY
Start: 2025-04-23 | End: 1900-01-01

## 2025-05-27 ENCOUNTER — APPOINTMENT (OUTPATIENT)
Dept: OBGYN | Facility: CLINIC | Age: 30
End: 2025-05-27
Payer: COMMERCIAL

## 2025-05-27 ENCOUNTER — OUTPATIENT (OUTPATIENT)
Dept: OUTPATIENT SERVICES | Facility: HOSPITAL | Age: 30
LOS: 1 days | End: 2025-05-27
Payer: COMMERCIAL

## 2025-05-27 VITALS
HEART RATE: 87 BPM | WEIGHT: 200 LBS | OXYGEN SATURATION: 100 % | BODY MASS INDEX: 32.14 KG/M2 | DIASTOLIC BLOOD PRESSURE: 72 MMHG | HEIGHT: 66 IN | SYSTOLIC BLOOD PRESSURE: 104 MMHG

## 2025-05-27 DIAGNOSIS — Z00.00 ENCOUNTER FOR GENERAL ADULT MEDICAL EXAMINATION WITHOUT ABNORMAL FINDINGS: ICD-10-CM

## 2025-05-27 PROCEDURE — 87481 CANDIDA DNA AMP PROBE: CPT

## 2025-05-27 PROCEDURE — 87491 CHLMYD TRACH DNA AMP PROBE: CPT

## 2025-05-27 PROCEDURE — 87661 TRICHOMONAS VAGINALIS AMPLIF: CPT

## 2025-05-27 PROCEDURE — 99213 OFFICE O/P EST LOW 20 MIN: CPT

## 2025-05-27 PROCEDURE — 81513 NFCT DS BV RNA VAG FLU ALG: CPT

## 2025-05-27 PROCEDURE — 87591 N.GONORRHOEAE DNA AMP PROB: CPT

## 2025-05-29 DIAGNOSIS — N76.0 ACUTE VAGINITIS: ICD-10-CM

## 2025-05-29 LAB
BV BACTERIA RRNA VAG QL NAA+PROBE: DETECTED
C GLABRATA RNA VAG QL NAA+PROBE: NOT DETECTED
C TRACH RRNA SPEC QL NAA+PROBE: NOT DETECTED
CANDIDA RRNA VAG QL PROBE: NOT DETECTED
N GONORRHOEA RRNA SPEC QL NAA+PROBE: NOT DETECTED
T VAGINALIS RRNA SPEC QL NAA+PROBE: NOT DETECTED

## 2025-05-29 RX ORDER — SECNIDAZOLE 2 G/4.8G
2 GRANULE ORAL
Qty: 1 | Refills: 0 | Status: ACTIVE | COMMUNITY
Start: 2025-05-29 | End: 1900-01-01

## 2025-05-30 DIAGNOSIS — N76.0 ACUTE VAGINITIS: ICD-10-CM

## 2025-09-04 ENCOUNTER — INPATIENT (INPATIENT)
Facility: HOSPITAL | Age: 30
LOS: 1 days | Discharge: HOME | DRG: 419 | End: 2025-09-06
Attending: STUDENT IN AN ORGANIZED HEALTH CARE EDUCATION/TRAINING PROGRAM | Admitting: STUDENT IN AN ORGANIZED HEALTH CARE EDUCATION/TRAINING PROGRAM
Payer: COMMERCIAL

## 2025-09-04 VITALS
HEIGHT: 66 IN | SYSTOLIC BLOOD PRESSURE: 122 MMHG | OXYGEN SATURATION: 100 % | DIASTOLIC BLOOD PRESSURE: 81 MMHG | WEIGHT: 190.04 LBS | TEMPERATURE: 98 F | HEART RATE: 72 BPM | RESPIRATION RATE: 20 BRPM

## 2025-09-04 DIAGNOSIS — R74.01 ELEVATION OF LEVELS OF LIVER TRANSAMINASE LEVELS: ICD-10-CM

## 2025-09-04 LAB
ALBUMIN SERPL ELPH-MCNC: 4.1 G/DL — SIGNIFICANT CHANGE UP (ref 3.5–5.2)
ALP SERPL-CCNC: 119 U/L — HIGH (ref 30–115)
ALT FLD-CCNC: 1711 U/L — HIGH (ref 0–41)
ANION GAP SERPL CALC-SCNC: 12 MMOL/L — SIGNIFICANT CHANGE UP (ref 7–14)
APAP SERPL-MCNC: <5 UG/ML — LOW (ref 10–30)
APTT BLD: 29.6 SEC — SIGNIFICANT CHANGE UP (ref 27–39.2)
AST SERPL-CCNC: 2341 U/L — HIGH (ref 0–41)
BASOPHILS # BLD AUTO: 0.02 K/UL — SIGNIFICANT CHANGE UP (ref 0–0.2)
BASOPHILS NFR BLD AUTO: 0.4 % — SIGNIFICANT CHANGE UP (ref 0–2)
BILIRUB SERPL-MCNC: 1.4 MG/DL — HIGH (ref 0.2–1.2)
BUN SERPL-MCNC: 11 MG/DL — SIGNIFICANT CHANGE UP (ref 10–20)
CALCIUM SERPL-MCNC: 9.1 MG/DL — SIGNIFICANT CHANGE UP (ref 8.4–10.5)
CHLORIDE SERPL-SCNC: 101 MMOL/L — SIGNIFICANT CHANGE UP (ref 98–110)
CO2 SERPL-SCNC: 25 MMOL/L — SIGNIFICANT CHANGE UP (ref 17–32)
CREAT SERPL-MCNC: 0.6 MG/DL — LOW (ref 0.7–1.5)
EGFR: 124 ML/MIN/1.73M2 — SIGNIFICANT CHANGE UP
EGFR: 124 ML/MIN/1.73M2 — SIGNIFICANT CHANGE UP
EOSINOPHIL # BLD AUTO: 0.03 K/UL — SIGNIFICANT CHANGE UP (ref 0–0.5)
EOSINOPHIL NFR BLD AUTO: 0.6 % — SIGNIFICANT CHANGE UP (ref 0–6)
GLUCOSE SERPL-MCNC: 86 MG/DL — SIGNIFICANT CHANGE UP (ref 70–99)
HCG SERPL QL: NEGATIVE — SIGNIFICANT CHANGE UP
HCT VFR BLD CALC: 34.9 % — SIGNIFICANT CHANGE UP (ref 34.5–45)
HGB BLD-MCNC: 11.6 G/DL — SIGNIFICANT CHANGE UP (ref 11.5–15.5)
IMM GRANULOCYTES # BLD AUTO: 0.01 K/UL — SIGNIFICANT CHANGE UP (ref 0–0.07)
IMM GRANULOCYTES NFR BLD AUTO: 0.2 % — SIGNIFICANT CHANGE UP (ref 0–0.9)
INR BLD: 0.98 RATIO — SIGNIFICANT CHANGE UP (ref 0.65–1.3)
LIDOCAIN IGE QN: 48 U/L — SIGNIFICANT CHANGE UP (ref 7–60)
LYMPHOCYTES # BLD AUTO: 2.23 K/UL — SIGNIFICANT CHANGE UP (ref 1–3.3)
LYMPHOCYTES NFR BLD AUTO: 45.7 % — HIGH (ref 13–44)
MCHC RBC-ENTMCNC: 27.7 PG — SIGNIFICANT CHANGE UP (ref 27–34)
MCHC RBC-ENTMCNC: 33.2 G/DL — SIGNIFICANT CHANGE UP (ref 32–36)
MCV RBC AUTO: 83.3 FL — SIGNIFICANT CHANGE UP (ref 80–100)
MONOCYTES # BLD AUTO: 0.58 K/UL — SIGNIFICANT CHANGE UP (ref 0–0.9)
MONOCYTES NFR BLD AUTO: 11.9 % — SIGNIFICANT CHANGE UP (ref 2–14)
NEUTROPHILS # BLD AUTO: 2.01 K/UL — SIGNIFICANT CHANGE UP (ref 1.8–7.4)
NEUTROPHILS NFR BLD AUTO: 41.2 % — LOW (ref 43–77)
NRBC # BLD AUTO: 0 K/UL — SIGNIFICANT CHANGE UP (ref 0–0)
NRBC # FLD: 0 K/UL — SIGNIFICANT CHANGE UP (ref 0–0)
NRBC BLD AUTO-RTO: 0 /100 WBCS — SIGNIFICANT CHANGE UP (ref 0–0)
PLATELET # BLD AUTO: 254 K/UL — SIGNIFICANT CHANGE UP (ref 150–400)
PMV BLD: 9.1 FL — SIGNIFICANT CHANGE UP (ref 7–13)
POTASSIUM SERPL-MCNC: 4.3 MMOL/L — SIGNIFICANT CHANGE UP (ref 3.5–5)
POTASSIUM SERPL-SCNC: 4.3 MMOL/L — SIGNIFICANT CHANGE UP (ref 3.5–5)
PROT SERPL-MCNC: 6.9 G/DL — SIGNIFICANT CHANGE UP (ref 6–8)
PROTHROM AB SERPL-ACNC: 11.5 SEC — SIGNIFICANT CHANGE UP (ref 9.95–12.87)
RBC # BLD: 4.19 M/UL — SIGNIFICANT CHANGE UP (ref 3.8–5.2)
RBC # FLD: 11.9 % — SIGNIFICANT CHANGE UP (ref 10.3–14.5)
SODIUM SERPL-SCNC: 138 MMOL/L — SIGNIFICANT CHANGE UP (ref 135–146)
WBC # BLD: 4.88 K/UL — SIGNIFICANT CHANGE UP (ref 3.8–10.5)
WBC # FLD AUTO: 4.88 K/UL — SIGNIFICANT CHANGE UP (ref 3.8–10.5)

## 2025-09-04 PROCEDURE — 85027 COMPLETE CBC AUTOMATED: CPT

## 2025-09-04 PROCEDURE — C1889: CPT

## 2025-09-04 PROCEDURE — S2900: CPT

## 2025-09-04 PROCEDURE — 99223 1ST HOSP IP/OBS HIGH 75: CPT

## 2025-09-04 PROCEDURE — C9399: CPT

## 2025-09-04 PROCEDURE — 74181 MRI ABDOMEN W/O CONTRAST: CPT

## 2025-09-04 PROCEDURE — 99222 1ST HOSP IP/OBS MODERATE 55: CPT | Mod: 57

## 2025-09-04 PROCEDURE — 74181 MRI ABDOMEN W/O CONTRAST: CPT | Mod: 26

## 2025-09-04 PROCEDURE — 76705 ECHO EXAM OF ABDOMEN: CPT | Mod: 26

## 2025-09-04 PROCEDURE — 99285 EMERGENCY DEPT VISIT HI MDM: CPT

## 2025-09-04 PROCEDURE — 80053 COMPREHEN METABOLIC PANEL: CPT

## 2025-09-04 PROCEDURE — 83735 ASSAY OF MAGNESIUM: CPT

## 2025-09-04 PROCEDURE — 36415 COLL VENOUS BLD VENIPUNCTURE: CPT

## 2025-09-04 PROCEDURE — 80076 HEPATIC FUNCTION PANEL: CPT

## 2025-09-04 PROCEDURE — 84100 ASSAY OF PHOSPHORUS: CPT

## 2025-09-04 PROCEDURE — 88304 TISSUE EXAM BY PATHOLOGIST: CPT

## 2025-09-04 PROCEDURE — 74177 CT ABD & PELVIS W/CONTRAST: CPT | Mod: 26

## 2025-09-04 PROCEDURE — 85025 COMPLETE CBC W/AUTO DIFF WBC: CPT

## 2025-09-04 PROCEDURE — 80048 BASIC METABOLIC PNL TOTAL CA: CPT

## 2025-09-04 RX ORDER — KETOROLAC TROMETHAMINE 30 MG/ML
15 INJECTION, SOLUTION INTRAMUSCULAR; INTRAVENOUS EVERY 6 HOURS
Refills: 0 | Status: DISCONTINUED | OUTPATIENT
Start: 2025-09-04 | End: 2025-09-05

## 2025-09-04 RX ORDER — KETOROLAC TROMETHAMINE 30 MG/ML
15 INJECTION, SOLUTION INTRAMUSCULAR; INTRAVENOUS ONCE
Refills: 0 | Status: DISCONTINUED | OUTPATIENT
Start: 2025-09-04 | End: 2025-09-04

## 2025-09-04 RX ORDER — ONDANSETRON HCL/PF 4 MG/2 ML
4 VIAL (ML) INJECTION EVERY 8 HOURS
Refills: 0 | Status: DISCONTINUED | OUTPATIENT
Start: 2025-09-04 | End: 2025-09-05

## 2025-09-04 RX ORDER — MELATONIN 5 MG
3 TABLET ORAL AT BEDTIME
Refills: 0 | Status: DISCONTINUED | OUTPATIENT
Start: 2025-09-04 | End: 2025-09-05

## 2025-09-04 RX ORDER — ONDANSETRON HCL/PF 4 MG/2 ML
4 VIAL (ML) INJECTION ONCE
Refills: 0 | Status: COMPLETED | OUTPATIENT
Start: 2025-09-04 | End: 2025-09-04

## 2025-09-04 RX ORDER — ACETAMINOPHEN 500 MG/5ML
650 LIQUID (ML) ORAL EVERY 6 HOURS
Refills: 0 | Status: DISCONTINUED | OUTPATIENT
Start: 2025-09-04 | End: 2025-09-04

## 2025-09-04 RX ORDER — METOCLOPRAMIDE HCL 10 MG
10 TABLET ORAL ONCE
Refills: 0 | Status: COMPLETED | OUTPATIENT
Start: 2025-09-04 | End: 2025-09-04

## 2025-09-04 RX ORDER — MAGNESIUM, ALUMINUM HYDROXIDE 200-200 MG
30 TABLET,CHEWABLE ORAL EVERY 4 HOURS
Refills: 0 | Status: DISCONTINUED | OUTPATIENT
Start: 2025-09-04 | End: 2025-09-05

## 2025-09-04 RX ORDER — OMEPRAZOLE 20 MG/1
1 CAPSULE, DELAYED RELEASE ORAL
Refills: 0 | DISCHARGE

## 2025-09-04 RX ADMIN — Medication 2 MILLIGRAM(S): at 12:07

## 2025-09-04 RX ADMIN — KETOROLAC TROMETHAMINE 15 MILLIGRAM(S): 30 INJECTION, SOLUTION INTRAMUSCULAR; INTRAVENOUS at 04:14

## 2025-09-04 RX ADMIN — Medication 100 MILLILITER(S): at 18:40

## 2025-09-04 RX ADMIN — Medication 20 MILLIGRAM(S): at 18:37

## 2025-09-04 RX ADMIN — Medication 4 MILLIGRAM(S): at 04:14

## 2025-09-04 RX ADMIN — Medication 20 MILLIGRAM(S): at 04:14

## 2025-09-04 RX ADMIN — KETOROLAC TROMETHAMINE 15 MILLIGRAM(S): 30 INJECTION, SOLUTION INTRAMUSCULAR; INTRAVENOUS at 21:45

## 2025-09-04 RX ADMIN — Medication 4 MILLIGRAM(S): at 05:36

## 2025-09-04 RX ADMIN — Medication 2 MILLIGRAM(S): at 13:00

## 2025-09-04 RX ADMIN — Medication 10 MILLIGRAM(S): at 04:14

## 2025-09-04 RX ADMIN — Medication 2000 MILLILITER(S): at 04:14

## 2025-09-04 RX ADMIN — KETOROLAC TROMETHAMINE 15 MILLIGRAM(S): 30 INJECTION, SOLUTION INTRAMUSCULAR; INTRAVENOUS at 21:05

## 2025-09-05 ENCOUNTER — RESULT REVIEW (OUTPATIENT)
Age: 30
End: 2025-09-05

## 2025-09-05 LAB
ALBUMIN SERPL ELPH-MCNC: 4 G/DL — SIGNIFICANT CHANGE UP (ref 3.5–5.2)
ALP SERPL-CCNC: 160 U/L — HIGH (ref 30–115)
ALT FLD-CCNC: 1371 U/L — HIGH (ref 0–41)
ANION GAP SERPL CALC-SCNC: 11 MMOL/L — SIGNIFICANT CHANGE UP (ref 7–14)
AST SERPL-CCNC: 534 U/L — HIGH (ref 0–41)
BASOPHILS # BLD AUTO: 0.04 K/UL — SIGNIFICANT CHANGE UP (ref 0–0.2)
BASOPHILS NFR BLD AUTO: 0.7 % — SIGNIFICANT CHANGE UP (ref 0–2)
BILIRUB SERPL-MCNC: 1.4 MG/DL — HIGH (ref 0.2–1.2)
BUN SERPL-MCNC: 9 MG/DL — LOW (ref 10–20)
CALCIUM SERPL-MCNC: 9.1 MG/DL — SIGNIFICANT CHANGE UP (ref 8.4–10.5)
CHLORIDE SERPL-SCNC: 104 MMOL/L — SIGNIFICANT CHANGE UP (ref 98–110)
CO2 SERPL-SCNC: 24 MMOL/L — SIGNIFICANT CHANGE UP (ref 17–32)
CREAT SERPL-MCNC: 0.5 MG/DL — LOW (ref 0.7–1.5)
EGFR: 129 ML/MIN/1.73M2 — SIGNIFICANT CHANGE UP
EGFR: 129 ML/MIN/1.73M2 — SIGNIFICANT CHANGE UP
EOSINOPHIL # BLD AUTO: 0.12 K/UL — SIGNIFICANT CHANGE UP (ref 0–0.5)
EOSINOPHIL NFR BLD AUTO: 2.2 % — SIGNIFICANT CHANGE UP (ref 0–6)
GLUCOSE SERPL-MCNC: 74 MG/DL — SIGNIFICANT CHANGE UP (ref 70–99)
HAV IGM SER-ACNC: SIGNIFICANT CHANGE UP
HBV CORE IGM SER-ACNC: SIGNIFICANT CHANGE UP
HBV SURFACE AG SER-ACNC: SIGNIFICANT CHANGE UP
HCT VFR BLD CALC: 33.5 % — LOW (ref 34.5–45)
HCV AB S/CO SERPL IA: 0.11 S/CO — SIGNIFICANT CHANGE UP (ref 0–0.79)
HCV AB SERPL-IMP: SIGNIFICANT CHANGE UP
HGB BLD-MCNC: 10.7 G/DL — LOW (ref 11.5–15.5)
IMM GRANULOCYTES # BLD AUTO: 0.02 K/UL — SIGNIFICANT CHANGE UP (ref 0–0.07)
IMM GRANULOCYTES NFR BLD AUTO: 0.4 % — SIGNIFICANT CHANGE UP (ref 0–0.9)
LYMPHOCYTES # BLD AUTO: 1.82 K/UL — SIGNIFICANT CHANGE UP (ref 1–3.3)
LYMPHOCYTES NFR BLD AUTO: 33.2 % — SIGNIFICANT CHANGE UP (ref 13–44)
MCHC RBC-ENTMCNC: 27 PG — SIGNIFICANT CHANGE UP (ref 27–34)
MCHC RBC-ENTMCNC: 31.9 G/DL — LOW (ref 32–36)
MCV RBC AUTO: 84.4 FL — SIGNIFICANT CHANGE UP (ref 80–100)
MONOCYTES # BLD AUTO: 0.45 K/UL — SIGNIFICANT CHANGE UP (ref 0–0.9)
MONOCYTES NFR BLD AUTO: 8.2 % — SIGNIFICANT CHANGE UP (ref 2–14)
NEUTROPHILS # BLD AUTO: 3.04 K/UL — SIGNIFICANT CHANGE UP (ref 1.8–7.4)
NEUTROPHILS NFR BLD AUTO: 55.3 % — SIGNIFICANT CHANGE UP (ref 43–77)
NRBC # BLD AUTO: 0 K/UL — SIGNIFICANT CHANGE UP (ref 0–0)
NRBC # FLD: 0 K/UL — SIGNIFICANT CHANGE UP (ref 0–0)
NRBC BLD AUTO-RTO: 0 /100 WBCS — SIGNIFICANT CHANGE UP (ref 0–0)
PLATELET # BLD AUTO: 249 K/UL — SIGNIFICANT CHANGE UP (ref 150–400)
PMV BLD: 9.8 FL — SIGNIFICANT CHANGE UP (ref 7–13)
POTASSIUM SERPL-MCNC: 4.9 MMOL/L — SIGNIFICANT CHANGE UP (ref 3.5–5)
POTASSIUM SERPL-SCNC: 4.9 MMOL/L — SIGNIFICANT CHANGE UP (ref 3.5–5)
PROT SERPL-MCNC: 6.5 G/DL — SIGNIFICANT CHANGE UP (ref 6–8)
RBC # BLD: 3.97 M/UL — SIGNIFICANT CHANGE UP (ref 3.8–5.2)
RBC # FLD: 11.9 % — SIGNIFICANT CHANGE UP (ref 10.3–14.5)
SODIUM SERPL-SCNC: 139 MMOL/L — SIGNIFICANT CHANGE UP (ref 135–146)
WBC # BLD: 5.49 K/UL — SIGNIFICANT CHANGE UP (ref 3.8–10.5)
WBC # FLD AUTO: 5.49 K/UL — SIGNIFICANT CHANGE UP (ref 3.8–10.5)

## 2025-09-05 PROCEDURE — 99233 SBSQ HOSP IP/OBS HIGH 50: CPT

## 2025-09-05 PROCEDURE — S2900 ROBOTIC SURGICAL SYSTEM: CPT | Mod: NC

## 2025-09-05 PROCEDURE — 99232 SBSQ HOSP IP/OBS MODERATE 35: CPT

## 2025-09-05 PROCEDURE — 47562 LAPAROSCOPIC CHOLECYSTECTOMY: CPT

## 2025-09-05 PROCEDURE — 88304 TISSUE EXAM BY PATHOLOGIST: CPT | Mod: 26

## 2025-09-05 RX ORDER — ENOXAPARIN SODIUM 100 MG/ML
40 INJECTION SUBCUTANEOUS EVERY 24 HOURS
Refills: 0 | Status: DISCONTINUED | OUTPATIENT
Start: 2025-09-05 | End: 2025-09-06

## 2025-09-05 RX ORDER — OXYCODONE HYDROCHLORIDE 30 MG/1
5 TABLET ORAL ONCE
Refills: 0 | Status: DISCONTINUED | OUTPATIENT
Start: 2025-09-05 | End: 2025-09-05

## 2025-09-05 RX ORDER — KETOROLAC TROMETHAMINE 30 MG/ML
15 INJECTION, SOLUTION INTRAMUSCULAR; INTRAVENOUS EVERY 6 HOURS
Refills: 0 | Status: DISCONTINUED | OUTPATIENT
Start: 2025-09-05 | End: 2025-09-06

## 2025-09-05 RX ORDER — ACETAMINOPHEN 500 MG/5ML
650 LIQUID (ML) ORAL EVERY 6 HOURS
Refills: 0 | Status: DISCONTINUED | OUTPATIENT
Start: 2025-09-05 | End: 2025-09-06

## 2025-09-05 RX ORDER — CEFAZOLIN SODIUM IN 0.9 % NACL 3 G/100 ML
2000 INTRAVENOUS SOLUTION, PIGGYBACK (ML) INTRAVENOUS EVERY 8 HOURS
Refills: 0 | Status: COMPLETED | OUTPATIENT
Start: 2025-09-05 | End: 2025-09-06

## 2025-09-05 RX ORDER — OXYCODONE HYDROCHLORIDE 30 MG/1
5 TABLET ORAL EVERY 6 HOURS
Refills: 0 | Status: DISCONTINUED | OUTPATIENT
Start: 2025-09-05 | End: 2025-09-06

## 2025-09-05 RX ORDER — HYDROMORPHONE/SOD CHLOR,ISO/PF 2 MG/10 ML
1 SYRINGE (ML) INJECTION
Refills: 0 | Status: DISCONTINUED | OUTPATIENT
Start: 2025-09-05 | End: 2025-09-05

## 2025-09-05 RX ORDER — SODIUM CHLORIDE 9 G/1000ML
1000 INJECTION, SOLUTION INTRAVENOUS
Refills: 0 | Status: DISCONTINUED | OUTPATIENT
Start: 2025-09-05 | End: 2025-09-05

## 2025-09-05 RX ORDER — ONDANSETRON HCL/PF 4 MG/2 ML
4 VIAL (ML) INJECTION EVERY 6 HOURS
Refills: 0 | Status: DISCONTINUED | OUTPATIENT
Start: 2025-09-05 | End: 2025-09-06

## 2025-09-05 RX ORDER — IBUPROFEN 200 MG
600 TABLET ORAL ONCE
Refills: 0 | Status: COMPLETED | OUTPATIENT
Start: 2025-09-05 | End: 2025-09-05

## 2025-09-05 RX ORDER — HYDROMORPHONE/SOD CHLOR,ISO/PF 2 MG/10 ML
0.5 SYRINGE (ML) INJECTION
Refills: 0 | Status: DISCONTINUED | OUTPATIENT
Start: 2025-09-05 | End: 2025-09-05

## 2025-09-05 RX ADMIN — Medication 20 MILLIGRAM(S): at 05:33

## 2025-09-05 RX ADMIN — Medication 650 MILLIGRAM(S): at 22:36

## 2025-09-05 RX ADMIN — Medication 600 MILLIGRAM(S): at 05:33

## 2025-09-05 RX ADMIN — Medication 650 MILLIGRAM(S): at 21:10

## 2025-09-05 RX ADMIN — Medication 100 MILLIGRAM(S): at 21:11

## 2025-09-05 RX ADMIN — Medication 600 MILLIGRAM(S): at 06:40

## 2025-09-05 RX ADMIN — KETOROLAC TROMETHAMINE 15 MILLIGRAM(S): 30 INJECTION, SOLUTION INTRAMUSCULAR; INTRAVENOUS at 09:57

## 2025-09-05 RX ADMIN — KETOROLAC TROMETHAMINE 15 MILLIGRAM(S): 30 INJECTION, SOLUTION INTRAMUSCULAR; INTRAVENOUS at 10:27

## 2025-09-06 ENCOUNTER — TRANSCRIPTION ENCOUNTER (OUTPATIENT)
Age: 30
End: 2025-09-06

## 2025-09-06 VITALS
OXYGEN SATURATION: 100 % | HEART RATE: 67 BPM | RESPIRATION RATE: 18 BRPM | DIASTOLIC BLOOD PRESSURE: 78 MMHG | TEMPERATURE: 99 F | SYSTOLIC BLOOD PRESSURE: 119 MMHG

## 2025-09-06 LAB
ALBUMIN SERPL ELPH-MCNC: 4.2 G/DL — SIGNIFICANT CHANGE UP (ref 3.5–5.2)
ALP SERPL-CCNC: 140 U/L — HIGH (ref 30–115)
ALT FLD-CCNC: 866 U/L — HIGH (ref 0–41)
ANION GAP SERPL CALC-SCNC: 14 MMOL/L — SIGNIFICANT CHANGE UP (ref 7–14)
AST SERPL-CCNC: 155 U/L — HIGH (ref 0–41)
BILIRUB DIRECT SERPL-MCNC: 0.3 MG/DL — SIGNIFICANT CHANGE UP (ref 0–0.3)
BILIRUB INDIRECT FLD-MCNC: 0.3 MG/DL — SIGNIFICANT CHANGE UP (ref 0.2–1.2)
BILIRUB SERPL-MCNC: 0.6 MG/DL — SIGNIFICANT CHANGE UP (ref 0.2–1.2)
BUN SERPL-MCNC: 4 MG/DL — LOW (ref 10–20)
CALCIUM SERPL-MCNC: 9.1 MG/DL — SIGNIFICANT CHANGE UP (ref 8.4–10.5)
CHLORIDE SERPL-SCNC: 101 MMOL/L — SIGNIFICANT CHANGE UP (ref 98–110)
CO2 SERPL-SCNC: 23 MMOL/L — SIGNIFICANT CHANGE UP (ref 17–32)
CREAT SERPL-MCNC: 0.5 MG/DL — LOW (ref 0.7–1.5)
EGFR: 129 ML/MIN/1.73M2 — SIGNIFICANT CHANGE UP
EGFR: 129 ML/MIN/1.73M2 — SIGNIFICANT CHANGE UP
GLUCOSE SERPL-MCNC: 105 MG/DL — HIGH (ref 70–99)
HCT VFR BLD CALC: 31.9 % — LOW (ref 34.5–45)
HGB BLD-MCNC: 10.9 G/DL — LOW (ref 11.5–15.5)
MAGNESIUM SERPL-MCNC: 1.9 MG/DL — SIGNIFICANT CHANGE UP (ref 1.8–2.4)
MCHC RBC-ENTMCNC: 27.9 PG — SIGNIFICANT CHANGE UP (ref 27–34)
MCHC RBC-ENTMCNC: 34.2 G/DL — SIGNIFICANT CHANGE UP (ref 32–36)
MCV RBC AUTO: 81.8 FL — SIGNIFICANT CHANGE UP (ref 80–100)
NRBC # BLD AUTO: 0 K/UL — SIGNIFICANT CHANGE UP (ref 0–0)
NRBC # FLD: 0 K/UL — SIGNIFICANT CHANGE UP (ref 0–0)
NRBC BLD AUTO-RTO: 0 /100 WBCS — SIGNIFICANT CHANGE UP (ref 0–0)
PHOSPHATE SERPL-MCNC: 3.8 MG/DL — SIGNIFICANT CHANGE UP (ref 2.1–4.9)
PLATELET # BLD AUTO: 289 K/UL — SIGNIFICANT CHANGE UP (ref 150–400)
PMV BLD: 9.8 FL — SIGNIFICANT CHANGE UP (ref 7–13)
POTASSIUM SERPL-MCNC: 4.4 MMOL/L — SIGNIFICANT CHANGE UP (ref 3.5–5)
POTASSIUM SERPL-SCNC: 4.4 MMOL/L — SIGNIFICANT CHANGE UP (ref 3.5–5)
PROT SERPL-MCNC: 6.5 G/DL — SIGNIFICANT CHANGE UP (ref 6–8)
RBC # BLD: 3.9 M/UL — SIGNIFICANT CHANGE UP (ref 3.8–5.2)
RBC # FLD: 11.8 % — SIGNIFICANT CHANGE UP (ref 10.3–14.5)
SODIUM SERPL-SCNC: 138 MMOL/L — SIGNIFICANT CHANGE UP (ref 135–146)
WBC # BLD: 10.4 K/UL — SIGNIFICANT CHANGE UP (ref 3.8–10.5)
WBC # FLD AUTO: 10.4 K/UL — SIGNIFICANT CHANGE UP (ref 3.8–10.5)

## 2025-09-06 PROCEDURE — 99232 SBSQ HOSP IP/OBS MODERATE 35: CPT

## 2025-09-06 PROCEDURE — 99024 POSTOP FOLLOW-UP VISIT: CPT

## 2025-09-06 RX ORDER — SENNA 187 MG
2 TABLET ORAL ONCE
Refills: 0 | Status: COMPLETED | OUTPATIENT
Start: 2025-09-06 | End: 2025-09-06

## 2025-09-06 RX ORDER — POLYETHYLENE GLYCOL 3350 17 G/17G
17 POWDER, FOR SOLUTION ORAL ONCE
Refills: 0 | Status: COMPLETED | OUTPATIENT
Start: 2025-09-06 | End: 2025-09-06

## 2025-09-06 RX ADMIN — Medication 100 MILLIGRAM(S): at 06:08

## 2025-09-06 RX ADMIN — Medication 650 MILLIGRAM(S): at 07:32

## 2025-09-06 RX ADMIN — Medication 1 APPLICATION(S): at 06:08

## 2025-09-06 RX ADMIN — Medication 650 MILLIGRAM(S): at 10:20

## 2025-09-06 RX ADMIN — Medication 2 TABLET(S): at 10:10

## 2025-09-06 RX ADMIN — ENOXAPARIN SODIUM 40 MILLIGRAM(S): 100 INJECTION SUBCUTANEOUS at 06:12

## 2025-09-06 RX ADMIN — POLYETHYLENE GLYCOL 3350 17 GRAM(S): 17 POWDER, FOR SOLUTION ORAL at 10:10

## 2025-09-06 RX ADMIN — Medication 650 MILLIGRAM(S): at 10:11

## 2025-09-06 RX ADMIN — Medication 650 MILLIGRAM(S): at 06:12

## 2025-09-14 DIAGNOSIS — E66.811 OBESITY, CLASS 1: ICD-10-CM

## 2025-09-14 DIAGNOSIS — K21.9 GASTRO-ESOPHAGEAL REFLUX DISEASE WITHOUT ESOPHAGITIS: ICD-10-CM

## 2025-09-14 DIAGNOSIS — F17.210 NICOTINE DEPENDENCE, CIGARETTES, UNCOMPLICATED: ICD-10-CM

## 2025-09-14 DIAGNOSIS — K44.9 DIAPHRAGMATIC HERNIA WITHOUT OBSTRUCTION OR GANGRENE: ICD-10-CM

## 2025-09-14 DIAGNOSIS — G93.2 BENIGN INTRACRANIAL HYPERTENSION: ICD-10-CM

## 2025-09-14 DIAGNOSIS — J45.909 UNSPECIFIED ASTHMA, UNCOMPLICATED: ICD-10-CM

## 2025-09-14 DIAGNOSIS — R74.01 ELEVATION OF LEVELS OF LIVER TRANSAMINASE LEVELS: ICD-10-CM

## 2025-09-14 DIAGNOSIS — K80.00 CALCULUS OF GALLBLADDER WITH ACUTE CHOLECYSTITIS WITHOUT OBSTRUCTION: ICD-10-CM

## 2025-09-19 ENCOUNTER — TRANSCRIPTION ENCOUNTER (OUTPATIENT)
Age: 30
End: 2025-09-19

## 2025-09-19 ENCOUNTER — APPOINTMENT (OUTPATIENT)
Dept: SURGERY | Facility: CLINIC | Age: 30
End: 2025-09-19
Payer: COMMERCIAL

## 2025-09-19 VITALS — HEIGHT: 66 IN | TEMPERATURE: 98 F | BODY MASS INDEX: 31.34 KG/M2 | WEIGHT: 195 LBS

## 2025-09-19 DIAGNOSIS — Z90.49 ACQUIRED ABSENCE OF OTHER SPECIFIED PARTS OF DIGESTIVE TRACT: ICD-10-CM

## 2025-09-19 PROCEDURE — 99024 POSTOP FOLLOW-UP VISIT: CPT
